# Patient Record
Sex: MALE | Race: WHITE | NOT HISPANIC OR LATINO | Employment: OTHER | URBAN - METROPOLITAN AREA
[De-identification: names, ages, dates, MRNs, and addresses within clinical notes are randomized per-mention and may not be internally consistent; named-entity substitution may affect disease eponyms.]

---

## 2023-07-05 ENCOUNTER — MEDICAL CORRESPONDENCE (OUTPATIENT)
Dept: HEALTH INFORMATION MANAGEMENT | Facility: CLINIC | Age: 68
End: 2023-07-05
Payer: COMMERCIAL

## 2023-07-06 ENCOUNTER — HOSPITAL ENCOUNTER (INPATIENT)
Facility: CLINIC | Age: 68
LOS: 6 days | Discharge: HOME | DRG: 477 | End: 2023-07-12
Attending: INTERNAL MEDICINE | Admitting: INTERNAL MEDICINE
Payer: COMMERCIAL

## 2023-07-06 ENCOUNTER — MEDICAL CORRESPONDENCE (OUTPATIENT)
Dept: HEALTH INFORMATION MANAGEMENT | Facility: CLINIC | Age: 68
End: 2023-07-06
Payer: COMMERCIAL

## 2023-07-06 DIAGNOSIS — M86.28 SUBACUTE OSTEOMYELITIS, OTHER SITE (H): Primary | ICD-10-CM

## 2023-07-06 PROBLEM — M86.9 OSTEOMYELITIS (H): Status: ACTIVE | Noted: 2023-07-06

## 2023-07-06 LAB
CREAT SERPL-MCNC: 0.59 MG/DL (ref 0.67–1.17)
GFR SERPL CREATININE-BSD FRML MDRD: >90 ML/MIN/1.73M2
HOLD SPECIMEN: NORMAL

## 2023-07-06 PROCEDURE — 36415 COLL VENOUS BLD VENIPUNCTURE: CPT | Performed by: PHYSICIAN ASSISTANT

## 2023-07-06 PROCEDURE — 99207 PR APP CREDIT; MD BILLING SHARED VISIT: CPT | Performed by: PHYSICIAN ASSISTANT

## 2023-07-06 PROCEDURE — 82565 ASSAY OF CREATININE: CPT | Performed by: PHYSICIAN ASSISTANT

## 2023-07-06 PROCEDURE — 999N000127 HC STATISTIC PERIPHERAL IV START W US GUIDANCE

## 2023-07-06 PROCEDURE — 99222 1ST HOSP IP/OBS MODERATE 55: CPT | Mod: FS | Performed by: INTERNAL MEDICINE

## 2023-07-06 PROCEDURE — 250N000011 HC RX IP 250 OP 636: Mod: JZ | Performed by: PHYSICIAN ASSISTANT

## 2023-07-06 PROCEDURE — 87040 BLOOD CULTURE FOR BACTERIA: CPT | Performed by: PHYSICIAN ASSISTANT

## 2023-07-06 PROCEDURE — 120N000002 HC R&B MED SURG/OB UMMC

## 2023-07-06 RX ORDER — AMOXICILLIN 250 MG
2 CAPSULE ORAL 2 TIMES DAILY PRN
Status: DISCONTINUED | OUTPATIENT
Start: 2023-07-06 | End: 2023-07-12 | Stop reason: HOSPADM

## 2023-07-06 RX ORDER — LIDOCAINE 40 MG/G
CREAM TOPICAL
Status: DISCONTINUED | OUTPATIENT
Start: 2023-07-06 | End: 2023-07-12 | Stop reason: HOSPADM

## 2023-07-06 RX ORDER — PIPERACILLIN SODIUM, TAZOBACTAM SODIUM 3; .375 G/15ML; G/15ML
3.38 INJECTION, POWDER, LYOPHILIZED, FOR SOLUTION INTRAVENOUS EVERY 6 HOURS
Status: DISCONTINUED | OUTPATIENT
Start: 2023-07-06 | End: 2023-07-12 | Stop reason: HOSPADM

## 2023-07-06 RX ORDER — ONDANSETRON 2 MG/ML
4 INJECTION INTRAMUSCULAR; INTRAVENOUS EVERY 6 HOURS PRN
Status: DISCONTINUED | OUTPATIENT
Start: 2023-07-06 | End: 2023-07-12 | Stop reason: HOSPADM

## 2023-07-06 RX ORDER — AMOXICILLIN 250 MG
1 CAPSULE ORAL 2 TIMES DAILY PRN
Status: DISCONTINUED | OUTPATIENT
Start: 2023-07-06 | End: 2023-07-12 | Stop reason: HOSPADM

## 2023-07-06 RX ORDER — ENOXAPARIN SODIUM 100 MG/ML
40 INJECTION SUBCUTANEOUS EVERY 24 HOURS
Status: DISCONTINUED | OUTPATIENT
Start: 2023-07-06 | End: 2023-07-10

## 2023-07-06 RX ORDER — ONDANSETRON 4 MG/1
4 TABLET, ORALLY DISINTEGRATING ORAL EVERY 6 HOURS PRN
Status: DISCONTINUED | OUTPATIENT
Start: 2023-07-06 | End: 2023-07-12 | Stop reason: HOSPADM

## 2023-07-06 RX ORDER — CALCIUM CARBONATE 500 MG/1
500 TABLET, CHEWABLE ORAL DAILY PRN
Status: DISCONTINUED | OUTPATIENT
Start: 2023-07-06 | End: 2023-07-12 | Stop reason: HOSPADM

## 2023-07-06 RX ADMIN — PIPERACILLIN AND TAZOBACTAM 3.38 G: 3; .375 INJECTION, POWDER, LYOPHILIZED, FOR SOLUTION INTRAVENOUS at 22:50

## 2023-07-06 RX ADMIN — ENOXAPARIN SODIUM 40 MG: 40 INJECTION SUBCUTANEOUS at 21:53

## 2023-07-06 ASSESSMENT — ACTIVITIES OF DAILY LIVING (ADL): ADLS_ACUITY_SCORE: 37

## 2023-07-06 NOTE — LETTER
Transition Communication Hand-off for Care Transitions to Next Level of Care Provider    Name: Steven Zavala  : 1955  MRN #: [id number]  Primary Care Provider: Merline Manley     Primary Clinic: [id number] [address]     Reason for Hospitalization:  Osteomyelitis (H) [M86.9]  Admit Date/Time: 2023  9:28 PM  Discharge Date: 2023  Payor Source: Payor: [facility] / Plan: [facility] DUAL / Product Type: HMO /     Discharge Plan:  Patient is discharging home. Team would like him to have OP follow up at a wound clinic. Sent referral/order to [facility].      Concern for non-adherence with plan of care: No   Discharge Needs Assessment:  Needs      Flowsheet Row Most Recent Value   Equipment Currently Used at Home lift device            Already enrolled in Tele-monitoring program and name of program: Unknown   Follow-up specialty is recommended: Yes    Follow-up plan:  No future appointments.    Any outstanding tests or procedures:        Referrals       Future Labs/Procedures    Wound Care Referral     Process Instructions:    Wound care services don't provide routine dressing changes. If this is for a surgical wound, a consult request should come from surgeon rather than PCP (unless surgeon is already aware).    Comments:    Please be aware that coverage of these services is subject to the terms and limitations of your health insurance plan.  Call member services at your health plan with any benefit or coverage questions.  Please call to schedule your appointment                MOR Wilson    Please see attached AVS/Discharge Summary.

## 2023-07-06 NOTE — LETTER
Recipient: [facility]           Sender: ShannanMOR           Date: July 12, 2023  Patient Name:  Steven Zavala  Patient YOB: 1955  Routing Message:    Patient needs outpatient follow up at the wound clinic. Please see attached orders. Thank you!   Shannan -[phone]      The documents accompanying this notice contain confidential information belonging to the sender.  This information is intended only for the use of the individual or entity named above.  The authorized recipient of this information is prohibited from disclosing this information to any other party and is required to destroy the information after its stated need has been fulfilled, unless otherwise required by state law.    If you are not the intended recipient, you are hereby notified that any disclosure, copy, distribution or action taken in reliance on the contents of these documents is strictly prohibited.  If you have received this document in error, please return it by fax to [phone] with a note on the cover sheet explaining why you are returning it (e.g. not your patient, not your provider, etc.).  If you need further assistance, please call .  Documents may also be returned by mail to Health Information [employer], , [address], [place], [place] [address].

## 2023-07-06 NOTE — H&P
[facility]    History and Physical - Hospitalist Service, GOLD TEAM        Date of Admission: 7/6/23    Assessment & Plan   Steven Zavala is a 67 year old male admitted on 7/6/23 to [facility], for management of sacral wound with osteomyelitis of R pubic ramus and ischial tuberosity. He has a history of quadriplegia, recent VTE on lifelong anticoagulation.     Infected full thickness pressure wound to sacrum, with tunneling  Osteomyelitis of right pubic ramus and ischial tuberosity  Wound developed approximately 6 weeks ago. Admitted to [facility] in [place] on 7/5/23 for this wound which was worsening, developed oozing. On exam at OSH tunneling was noted into deep tissue. Wound culture was obtained. Patient denies recent bleeding from site, fevers, chills. Tunneling noted. Started on vanco and Zosyn in OSH.   - W/u:    - Pelvic MRI WO/W Contrast 7/5/23 [facility]: deep medial right buttock soft tissue ulceration measuring 3.4 x 2.8 x 6.5 cm extending into the right ischial tuberosity with surrounding edema. No drainable abscess. Findings consistent with osteomyelitis involving the inferior right pubic ramus and ischial tuberosity. Reactive right inguinal lymph nodes.    - WBC not elevated. CRP 11.4, procalc 0.20.   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 Hematology at outside clinic recommended lifelong anticoagulation given his quadriplegic state + DVT hx.   - hold Eliquis for potential surgery  - subcutaneous Lovenox prophylaxis    Quadriplegia  Since 1980, paralysis from  neck down. Wife performs daily ROM and transfers at home. PT evaluated in [place], signed off. Patient declines any PT or OT here for ROM. No longer on chronic baclofen.   Neurogenic bladder chronic CIC. Insert hodges here due to likely upcoming surgery.   Recurrent UTIs Has prophylactic cipro at home. Also takes supplements to help with this.     Acute normocytic anemia, mild -trend. No evidence of bleeding from wound or other parts of body.     Chronic nausea PRN Zofran  Hx intestinal obstruction       Diet: Combination Diet Regular Diet Adult regular diet adult  DVT Prophylaxis: Enoxaparin (Lovenox) SQ  Hodges Catheter: Not present  Lines: None     Cardiac Monitoring: None  Code Status: Full Code full code    Clinically Significant Risk Factors Present on Admission                                Disposition Plan      Expected Discharge Date: 07/10/2023                The patient's care was discussed with the Attending Physician, Dr. Lindsay, Bedside Nurse and Patient.    Gabriela Muhammad PA-C  Hospitalist Service, [facility]  Securely message with [employer] info)  Text page via [facility] Paging/Directory   See signed in provider for up to date coverage information    ______________________________________________________________________    Chief Complaint   Sacral wound    History is obtained from the patient    History of Present Illness   Steven Zavala is a 67 year old male who is seen at the bedside after transfer from [facility] in [place].  He is here for management of a sacral wound with osteomyelitis.  He does not have sensation to the region so he denies any pain to the area.  He denies any recent fevers, chills, known bleeding.  He has had regular stools.  He denies any new or significant symptoms including pain to other areas of the body.  He has chronic intermittent nausea.      Past Medical History    See above.     Past Surgical History    No past surgical history on file.    Prior to Admission Medications   None        Review of Systems    The 10 point Review of Systems is negative other than noted in the HPI.     Physical Exam   Vital Signs:                    Weight: 0 lbs 0 oz    GENERAL: middle-aged male resting supine in bed, appears comfortable. NAD.   NEURO / PSYCH: Alert, converses appropriately. No focal deficits. Does not move any extremities due to quadriplegia.   HEENT: Anicteric sclera. PERRL. Mucous membranes moist.   CV: RRR. S1, S2. No murmurs appreciated.    RESPIRATORY: Effort normal. Lungs CTAB with no wheezing, rales, rhonchi.   GI: Abdomen soft and non distended with bowel sounds active. No tenderness, rebound, guarding.   MSK: no gross deformities  EXTREMITIES: nonedematous  SKIN: No jaundice. No rashes or lesions to exposed areas.       Medical Decision Making       60 MINUTES SPENT BY ME on the date of service doing chart review, history, exam, documentation & further activities per the note.      Data   No lab results found in last 7 days.

## 2023-07-06 NOTE — PROGRESS NOTES
[facility] -- Transfer Triage Note    Date of call: 23  Time of call: 1:17 PM    Reason for transfer: Procedure can be done here and not at referring hospital   Diagnosis:     R pubic ramus and ischial tuberosity with osteo.     Outside Records: Not available. Additional records requested to be faxed to [phone].    Stability of Patient: Patient is vitally stable, with no critical labs, and will likely remain stable throughout the transfer process  ICU: No    We received a phone call through our Physician Access line from Delores Cleary  PAC at [facility]. My understanding from this phone call is that Steven Zavala with  1955 is a 67 year old male with PMH of quadraplegia since , self cath, recent VTE on lifelong OAC per OSH Heme recs, and an ongoing sacral wound (appox 6 wks ago) that has now worsened, more drainage and would looking worse, on admission infectious and inflammatory markers under whelming but tunneling noted pelvic MRI showed R pubic ramus and ischial tuberosity with osteo.     Transfer Accepted? Yes  Dr Guerrier -- consult for I+D / [facility] -- primary team  Medicine team in [place] will have images of CT pelvis and MRI pelvis pushed to the cloud    Additional Comments:     Recommendations for Management and Stabilization: Given  Sending facility plans to transport patient via ambulance: Yes  Expected Time of Arrival for Transfer: 12-24 hrs  Arrival Location:  [facility]  Unit: TBD --      Kel Vinson MD

## 2023-07-07 LAB
ALBUMIN SERPL BCG-MCNC: 3.5 G/DL (ref 3.5–5.2)
ALP SERPL-CCNC: 65 U/L (ref 40–129)
ALT SERPL W P-5'-P-CCNC: 42 U/L (ref 0–70)
ANION GAP SERPL CALCULATED.3IONS-SCNC: 13 MMOL/L (ref 7–15)
AST SERPL W P-5'-P-CCNC: 22 U/L (ref 0–45)
BASOPHILS # BLD AUTO: 0.1 10E3/UL (ref 0–0.2)
BASOPHILS NFR BLD AUTO: 1 %
BILIRUB SERPL-MCNC: 0.3 MG/DL
BUN SERPL-MCNC: 18.1 MG/DL (ref 8–23)
CALCIUM SERPL-MCNC: 8.4 MG/DL (ref 8.8–10.2)
CHLORIDE SERPL-SCNC: 97 MMOL/L (ref 98–107)
CREAT SERPL-MCNC: 0.57 MG/DL (ref 0.67–1.17)
DEPRECATED HCO3 PLAS-SCNC: 21 MMOL/L (ref 22–29)
EOSINOPHIL # BLD AUTO: 0.2 10E3/UL (ref 0–0.7)
EOSINOPHIL NFR BLD AUTO: 2 %
ERYTHROCYTE [DISTWIDTH] IN BLOOD BY AUTOMATED COUNT: 12.6 % (ref 10–15)
GFR SERPL CREATININE-BSD FRML MDRD: >90 ML/MIN/1.73M2
GLUCOSE SERPL-MCNC: 96 MG/DL (ref 70–99)
HCT VFR BLD AUTO: 33.4 % (ref 40–53)
HGB BLD-MCNC: 11.2 G/DL (ref 13.3–17.7)
IMM GRANULOCYTES # BLD: 0.2 10E3/UL
IMM GRANULOCYTES NFR BLD: 2 %
LYMPHOCYTES # BLD AUTO: 1.6 10E3/UL (ref 0.8–5.3)
LYMPHOCYTES NFR BLD AUTO: 17 %
MCH RBC QN AUTO: 28.3 PG (ref 26.5–33)
MCHC RBC AUTO-ENTMCNC: 33.5 G/DL (ref 31.5–36.5)
MCV RBC AUTO: 84 FL (ref 78–100)
MONOCYTES # BLD AUTO: 0.7 10E3/UL (ref 0–1.3)
MONOCYTES NFR BLD AUTO: 7 %
NEUTROPHILS # BLD AUTO: 6.6 10E3/UL (ref 1.6–8.3)
NEUTROPHILS NFR BLD AUTO: 71 %
NRBC # BLD AUTO: 0 10E3/UL
NRBC BLD AUTO-RTO: 0 /100
PLATELET # BLD AUTO: 372 10E3/UL (ref 150–450)
POTASSIUM SERPL-SCNC: 4.9 MMOL/L (ref 3.4–5.3)
PROT SERPL-MCNC: 7 G/DL (ref 6.4–8.3)
RBC # BLD AUTO: 3.96 10E6/UL (ref 4.4–5.9)
SODIUM SERPL-SCNC: 131 MMOL/L (ref 136–145)
WBC # BLD AUTO: 9.3 10E3/UL (ref 4–11)

## 2023-07-07 PROCEDURE — 250N000013 HC RX MED GY IP 250 OP 250 PS 637: Performed by: PHYSICIAN ASSISTANT

## 2023-07-07 PROCEDURE — 99223 1ST HOSP IP/OBS HIGH 75: CPT | Performed by: INTERNAL MEDICINE

## 2023-07-07 PROCEDURE — 250N000011 HC RX IP 250 OP 636: Performed by: INTERNAL MEDICINE

## 2023-07-07 PROCEDURE — 250N000011 HC RX IP 250 OP 636: Mod: JZ | Performed by: PHYSICIAN ASSISTANT

## 2023-07-07 PROCEDURE — 99222 1ST HOSP IP/OBS MODERATE 55: CPT | Performed by: PHYSICIAN ASSISTANT

## 2023-07-07 PROCEDURE — 99232 SBSQ HOSP IP/OBS MODERATE 35: CPT | Performed by: INTERNAL MEDICINE

## 2023-07-07 PROCEDURE — 85025 COMPLETE CBC W/AUTO DIFF WBC: CPT | Performed by: PHYSICIAN ASSISTANT

## 2023-07-07 PROCEDURE — 80053 COMPREHEN METABOLIC PANEL: CPT | Performed by: PHYSICIAN ASSISTANT

## 2023-07-07 PROCEDURE — 36416 COLLJ CAPILLARY BLOOD SPEC: CPT | Performed by: PHYSICIAN ASSISTANT

## 2023-07-07 PROCEDURE — G0463 HOSPITAL OUTPT CLINIC VISIT: HCPCS | Mod: 25

## 2023-07-07 PROCEDURE — 120N000002 HC R&B MED SURG/OB UMMC

## 2023-07-07 PROCEDURE — 97602 WOUND(S) CARE NON-SELECTIVE: CPT

## 2023-07-07 PROCEDURE — 258N000003 HC RX IP 258 OP 636: Performed by: INTERNAL MEDICINE

## 2023-07-07 RX ORDER — ONDANSETRON 8 MG/1
8 TABLET, FILM COATED ORAL EVERY 8 HOURS PRN
COMMUNITY

## 2023-07-07 RX ORDER — HYDRALAZINE HYDROCHLORIDE 20 MG/ML
10 INJECTION INTRAMUSCULAR; INTRAVENOUS EVERY 6 HOURS PRN
Status: DISCONTINUED | OUTPATIENT
Start: 2023-07-07 | End: 2023-07-12 | Stop reason: HOSPADM

## 2023-07-07 RX ORDER — IBUPROFEN 200 MG
400 TABLET ORAL EVERY 6 HOURS PRN
COMMUNITY

## 2023-07-07 RX ADMIN — PIPERACILLIN AND TAZOBACTAM 3.38 G: 3; .375 INJECTION, POWDER, LYOPHILIZED, FOR SOLUTION INTRAVENOUS at 10:37

## 2023-07-07 RX ADMIN — PIPERACILLIN AND TAZOBACTAM 3.38 G: 3; .375 INJECTION, POWDER, LYOPHILIZED, FOR SOLUTION INTRAVENOUS at 04:44

## 2023-07-07 RX ADMIN — PIPERACILLIN AND TAZOBACTAM 3.38 G: 3; .375 INJECTION, POWDER, LYOPHILIZED, FOR SOLUTION INTRAVENOUS at 17:26

## 2023-07-07 RX ADMIN — VANCOMYCIN HYDROCHLORIDE 1500 MG: 10 INJECTION, POWDER, LYOPHILIZED, FOR SOLUTION INTRAVENOUS at 23:41

## 2023-07-07 RX ADMIN — VANCOMYCIN HYDROCHLORIDE 1500 MG: 10 INJECTION, POWDER, LYOPHILIZED, FOR SOLUTION INTRAVENOUS at 12:32

## 2023-07-07 RX ADMIN — VANCOMYCIN HYDROCHLORIDE 1500 MG: 10 INJECTION, POWDER, LYOPHILIZED, FOR SOLUTION INTRAVENOUS at 00:38

## 2023-07-07 RX ADMIN — PIPERACILLIN AND TAZOBACTAM 3.38 G: 3; .375 INJECTION, POWDER, LYOPHILIZED, FOR SOLUTION INTRAVENOUS at 22:06

## 2023-07-07 RX ADMIN — CALCIUM CARBONATE (ANTACID) CHEW TAB 500 MG 500 MG: 500 CHEW TAB at 10:37

## 2023-07-07 RX ADMIN — ENOXAPARIN SODIUM 40 MG: 40 INJECTION SUBCUTANEOUS at 22:06

## 2023-07-07 ASSESSMENT — ACTIVITIES OF DAILY LIVING (ADL)
ADLS_ACUITY_SCORE: 50
ADLS_ACUITY_SCORE: 60
TOILETING_ISSUES: YES
ADLS_ACUITY_SCORE: 60
BATHING: 2-->COMPLETELY DEPENDENT (NOT DEVELOPMENTALLY APPROPRIATE)
DRESSING/BATHING_DIFFICULTY: YES
TOILETING: 1-->ASSISTANCE (EQUIPMENT/PERSON) NEEDED (NOT DEVELOPMENTALLY APPROPRIATE)
ADLS_ACUITY_SCORE: 60
TRANSFERRING: 2-->COMPLETELY DEPENDENT (NOT DEVELOPMENTALLY APPROPRIATE)
ADLS_ACUITY_SCORE: 50
TRANSFERRING: 2-->COMPLETELY DEPENDENT
ADLS_ACUITY_SCORE: 50
DOING_ERRANDS_INDEPENDENTLY_DIFFICULTY: YES
CONCENTRATING,_REMEMBERING_OR_MAKING_DECISIONS_DIFFICULTY: NO
TOILETING: 1-->ASSISTANCE (EQUIPMENT/PERSON) NEEDED
EQUIPMENT_CURRENTLY_USED_AT_HOME: LIFT DEVICE
FALL_HISTORY_WITHIN_LAST_SIX_MONTHS: NO
DIFFICULTY_EATING/SWALLOWING: NO
TOILETING_ASSISTANCE: TOILETING DIFFICULTY, DEPENDENT
HEARING_DIFFICULTY_OR_DEAF: NO
ADLS_ACUITY_SCORE: 50
ADLS_ACUITY_SCORE: 50
DIFFICULTY_COMMUNICATING: NO
WEAR_GLASSES_OR_BLIND: NO
ADLS_ACUITY_SCORE: 50
ADLS_ACUITY_SCORE: 50
CHANGE_IN_FUNCTIONAL_STATUS_SINCE_ONSET_OF_CURRENT_ILLNESS/INJURY: NO
WALKING_OR_CLIMBING_STAIRS_DIFFICULTY: YES
DRESSING/BATHING: BATHING DIFFICULTY, ASSISTANCE 1 PERSON
DRESS: 2-->COMPLETELY DEPENDENT (NOT DEVELOPMENTALLY APPROPRIATE)
DRESS: 2-->COMPLETELY DEPENDENT
ADLS_ACUITY_SCORE: 50
ADLS_ACUITY_SCORE: 37

## 2023-07-07 NOTE — PHARMACY-ADMISSION MEDICATION HISTORY
Pharmacist Admission Medication History    Admission medication history is complete. The information provided in this note is only as accurate as the sources available at the time of the update.    Medication reconciliation/reorder completed by provider prior to medication history? No    Information Source(s): Hospital encounter summary from [facility] (7/5/23) and [employer] via hospital records.    Pertinent Information:   Updated PTA list according to medication history done at [facility] on 7/5/23.    Changes made to PTA medication list:    Added: All medications on PTA list.    Deleted: None    Changed: None    Medication Affordability:  Not including over the counter (OTC) medications, was there a time in the past 3 months when you did not take your medications as prescribed because of cost?: Unable to Assess    Allergies reviewed with patient and updates made in EHR: Reviewed by RN on 7/6/23.    Medication History Completed By: Turner Dunn [facility] 7/7/2023 12:31 PM    Prior to Admission medications    Medication Sig Last Dose Taking? Auth Provider Long Term End Date   apixaban ANTICOAGULANT (ELIQUIS) 5 MG tablet Take 5 mg by mouth 2 times daily  Yes Unknown, Entered By History     ibuprofen (ADVIL/MOTRIN) 200 MG tablet Take 400 mg by mouth every 6 hours as needed for pain PRN at PRN Yes Unknown, Entered By History     ondansetron (ZOFRAN) 8 MG tablet Take 8 mg by mouth every 8 hours as needed for nausea PRN at PRN Yes Unknown, Entered By History     UNABLE TO FIND Take 2 tablets by mouth daily MEDICATION NAME: Uqora (Urinary Health - Reduces UTI risk)  Yes Unknown, Entered By History     UNABLE TO FIND Take 1 packet by mouth 3 times daily MEDICATION NAME: Powder Mannose Powder  Yes Unknown, Entered By History     UNABLE TO FIND Take 1 Dose by mouth 2 times daily MEDICATION NAME: Oliver Powder (Therapeutic Nutrition Powder)  Yes Unknown, Entered By History

## 2023-07-07 NOTE — PLAN OF CARE
VS: Blood pressure (!) 141/74, pulse 80, temperature 98.7  F (37.1  C), temperature source Oral, resp. rate 16, height 1.829 m (6'), weight 108.1 kg (238 lb 5.1 oz), SpO2 100 %.   O2: 100% on RA   Output: Pt has hodges cath and is on bowl program   Last BM: 07/05/23   Activity: Liko lift. A2 with turning and repositioning.    Skin: Pt has tunneling would on left buttock. Smaller wound on R buttock.   Pain: Denies. Pt is quadriplegic.    CMS: AOX4. Pt is quadriplegic.    Dressing: WOC orders placed today. Wound care done per WOC orders.    Diet: Regular diet, thin liquids, takes pills whole with water.    LDA: L PIV for antibiotics. Hodges cath with adequate output.    Plan: Plan is for surgery in OR at [facility].    Additional Info: Wife is active in pt cares. She assists with feeding and can help with holding pt while doing wound cares.

## 2023-07-07 NOTE — CONSULTS
General surgery has been asked to assess patient's wound, a deep ulcer on the right ischial tuberosity. Has been present for a few weeks. No systemic signs of active infection. On exam, he has dark fibrinous exudate, and some fairly thick dark fluid that had pooled in the wound. I did not see any signs of necrotic tissue requiring emergent excision.  A/p - open wound on right ischial tuberosity in setting of quadriplegia since 1980. No signs of active infection. Patient was seen by orthopedic service who recommended continued wound cares and consideration of IR biopsy for osteomyelitis. I think that is a very reasonable plan. Alternatively, I also think that cleaning out the wound in the OR is an option. If primary team feels that is optimal, then he would need to be transferred over to [place] to a medicine service and reconsult general surgery. I discussed these options with patient and his wife. I emphasized to them that I think both options are reasonable. Questions answered. They are understanding of discussion and will discuss with the primary team tomorrow a.m. Please feel free to contact me with questions at pager [phone] or cell [phone].

## 2023-07-07 NOTE — PROGRESS NOTES
Received a call from patient's [facility] Care CoordinatorYanet. Provided an update.       narayan Care Yanet Keslser: [phone]    Shannan Palencia RNCC  RN Care Coordinator   Office: [phone]   Pager: [phone]

## 2023-07-07 NOTE — PHARMACY
Received a call from pharmacist at [facility] who wanted to update us that the following culture obtained at their hospital has sensitives back. Culture from right buttock on 7/5/23 grew polymicrobial organisms on gram stain (See below image copied from [employer]), currently only morganella morganii has sensitivities resulted. Of note, this morganella morganii is resistance to ampicillin and cefazolin. It is sensitive to Zosyn (among others, see below). Patient is currently receiving adequate coverage on Zosyn/vanco. Please see continue to check for updates in [employer] until culture finalized.

## 2023-07-07 NOTE — CONSULTS
Case and images reviewed with Dr. Rivas. At this time no reconstructive surgery is indicated. We would defer to general surgery if the patient requires surgical debridement of the wound. Flap reconstruction could be considered at a later time once the wound has been fully debrided, patient's medical condition and nutrition have been optimized, and appropriate social resources are in place such as low pressure bed and/or chair. Plastic surgery can be re consulted once these have been completed or follow up as outpatient at [facility] wound clinic.    Butch Berry MD  PGY-2 Plastic Surgery Resident

## 2023-07-07 NOTE — CONSULTS
GENERAL ID SERVICE CONSULTATION     Patient:  Steven Zavala   Date of birth 1955, Medical record number [id number]  Date of Visit:  07/07/2023  Date of Admission: 7/6/2023  Consult Requester:Odessa Lindsay MD            Assessment and Recommendations:   ASSESSMENT:  1. Stage 4 R IT ulceration with MRI suggesting underlying osteo. Surgical teams have evaluated and do not see an indication for surgery. The mainstay of therapy for sacral osteo in the setting of stage 4 ulcer/pressure injury is wound care +/- debridement of non-viable bone which would be a nidus for infection, with a short course of antibiotics in the near term (~2 weeks); prolonged antibiotics in the setting of an open and deep wound would be a risk for selecting resistant growth. If there is non-viable bone, his response to a 2-week course of antibiotics would be compromised. If general surgery declines debridement at this time, one strategy would be to do a diagnostic bone biopsy looking for non-viable bone that should prompt reconsideration of debridement. An alternative approach is to give a 2 week course of antibiotics and if there is no improvement or worsening proceed with debridement (or biopsy, but debridement would be preferred).  It is a bit unusual that Steven would present with ulcers now after many years without decubiti. I suspect that his ~ 3 weeks with UTI symptoms and also his DVT may have precipitated this by precluding good offloading and producing transient hypoalbuminemia, among other probable physiologic changes.  2. L IT stage 2 ulceration, improving  3. 2/2023 K oxytoca UTI, resolved  4. DVT, presumed to be provoked by 2/2023 UTI, on lifelong AC per his hematologist      RECOMMENDATION:  1. Continue pip/tazo for Morganella and vanco for GPC that appeared on gram stain from [facility] cultures that appear in our system under [facility]  2. Final identity and duration of treatment will depend on cultures. As above,  I don't anticipate a course of therapy much longer than 2 weeks   3. Await gen surg input on the role of debridement.    Thanks for this consult. ID will follow. Dr. Alexia Bentley will cover this weekend if there are questions. Dr. Yudy Patiño will assume care on 7/10. Discusse with Dr. Hernandez.    Salma Mcguire MD   of Medicine, Division of Infectious Diseases  Contact me on the [employer] rajesh or console  [facility] [phone]    ________________________________________________________________    Consult Question:.  Admission Diagnosis: Osteomyelitis (H) [M86.9]         History of Present Illness:     This is a 67-year-old man with past history of traumatic quadriplegia since 1980.  He is transferred from [facility] in [place] for management of sacral decubitus wounds.    Steven reports that he does not have a chronic history of wounds.  His recent past history was remarkable for the appearance of lower extremity spasms and malaise and February 2023.  He was ultimately diagnosed with UTI.  Urine culture done locally revealed 10-50,000 colonies Klebsiella oxytoca.  He received ciprofloxacin, and possibly a subsequent anti-infective given the duration of his symptoms which in total approximately 3 weeks; after this he did return to his prior baseline. Of note, the spasms he had with this UTI required him to spend more time in his chair than he would typically spend.    In late February 2023, he noticed leg swelling, was diagnosed with a DVT.  He saw oncology.  The conclusion was that this DVT in his lower extremity was provoked by UTI.  His oncologist reasoned that Steven's quadriplegia places him at elevated increased risk for future DVT and therefore recommended lifelong anticoagulation.  He is tolerating this well.    In the last several weeks, he has noted the appearance of bilateral wounds.  As above, he does not have a chronic history of wounds.  The left sacral wound began to show signs of  healing, but the right wound has been progressive and in the recent past has produced increasing quantities of drainage.  He presented to [facility] on 7/5/23 where a wound culture was done that shows polymorphic organisms on Gram stain and on culture preliminarily shows Morganella morganii. MRI showed: Pelvic MRI WO/W Contrast 7/5/23 [facility]: deep medial right buttock soft tissue ulceration measuring 3.4 x 2.8 x 6.5 cm extending into the right ischial tuberosity with surrounding edema. No drainable abscess. Findings consistent with osteomyelitis involving the inferior right pubic ramus and ischial tuberosity. Reactive right inguinal lymph nodes.   He was placed on empiric pip/tazo and vanco and transferred to [facility] for multidisciplinary input. Upon transfer to [facility] he was placed on empiric PIP Tazo and Vanco.   Plastic surgery has indicated there is no role for reconstruction at this time. General surgery's evaluation is pending.    In talking with Steven this afternoon, he feels relatively well.  No fever.  No malaise.  No spasm suggestive of urinary infection.    7-Day Micro Results     Procedure Component Value Units Date/Time    Blood Culture Hand, Left [[id number]]  (Normal) Collected: 07/06/23 2128    Order Status: Completed Lab Status: Preliminary result Updated: 07/07/23 1646    Specimen: Blood from Hand, Left      Culture No growth after 12 hours    Blood Culture Hand, Right [[id number]]  (Normal) Collected: 07/06/23 2128    Order Status: Completed Lab Status: Preliminary result Updated: 07/07/23 1646    Specimen: Blood from Hand, Right      Culture No growth after 12 hours           Recent culture results include:  All cultures:  No results for input(s): CULT in the last 168 hours.            Review of Systems:   CONSTITUTIONAL:  No fevers or chills  EYES: negative for icterus  ENT:  negative for hearing loss, tinnitus and sore throat  RESPIRATORY:  negative for cough with sputum and  dyspnea  CARDIOVASCULAR:  negative for chest pain, dyspnea  GASTROINTESTINAL:  negative for nausea, vomiting, diarrhea and constipation  GENITOURINARY:  negative for dysuria  HEME:  No easy bruising  INTEGUMENT:  negative for rash and pruritus  NEURO:  Negative for headache           Past Medical History:   Traumatic quadriplegia  UTI history  DVT in 2/2023, presumed to be provoked by UTI, on chronic AC in setting of his elevated risk of recurrence due to quadriplegia           Family History:   Non-contributory         Social History:     Social History     Tobacco Use     Smoking status: Not on file     Smokeless tobacco: Not on file   Substance Use Topics     Alcohol use: Not on file     History   Sexual Activity     Sexual activity: Not on file            Current Medications:       enoxaparin ANTICOAGULANT  40 mg Subcutaneous Q24H     piperacillin-tazobactam  3.375 g Intravenous Q6H     sodium chloride (PF)  3 mL Intracatheter Q8H     vancomycin  1,500 mg Intravenous Q12H            Allergies:   No Known Allergies         Physical Exam:   Vitals were reviewed  Patient Vitals for the past 24 hrs:   BP Temp Temp src Pulse Resp SpO2 Height Weight   07/07/23 1554 (!) 141/74 98.7  F (37.1  C) Oral 80 16 100 % -- --   07/07/23 0730 (!) 151/99 98.5  F (36.9  C) Oral 90 18 99 % -- --   07/06/23 2300 -- -- -- -- -- -- -- 108.1 kg (238 lb 5.1 oz)   07/06/23 2235 121/83 -- -- -- -- -- -- --   07/06/23 2231 (!) 77/63 -- -- -- -- -- -- --   07/06/23 2230 100/69 -- -- -- -- -- -- --   07/06/23 2225 (!) 88/62 -- -- -- -- -- -- --   07/06/23 2212 121/83 99.4  F (37.4  C) Oral 75 18 99 % 1.829 m (6') --     Ranges for his vital signs:  Temp:  [98.5  F (36.9  C)-99.4  F (37.4  C)] 98.7  F (37.1  C)  Pulse:  [75-90] 80  Resp:  [16-18] 16  BP: ()/(62-99) 141/74  Cuff Mean (mmHg):  [95] 95  SpO2:  [99 %-100 %] 100 %    Physical Examination:  GENERAL:  well-developed, well-nourished, in bed in no acute distress.   HEENT:  Head  is normocephalic, atraumatic   EYES:  Eyes have anicteric sclerae without conjunctival injection or stigmata of endocarditis.    ENT:  Oropharynx is moist without exudates or ulcers. Tongue is midline  NECK:  Supple. No  Cervical lymphadenopathy  LUNGS:  Clear to auscultation bilateral.   CARDIOVASCULAR:  Regular rate and rhythm with no murmurs, gallops or rubs.  ABDOMEN:  Normal bowel sounds, soft, nontender. No appreciable hepatosplenomegaly  SKIN: images reviewed from C documentation. The R IT area has a large defect, stage 4 ulcer, with surrounding erythema. L IT has a stage 2 ulcer  NEUROLOGIC:  Grossly nonfocal. Active x4 extremities         Laboratory Data:     Inflammatory Markers  No lab results found.    Hematology Studies    Recent Labs   Lab Test 07/07/23  0759   WBC 9.3   HGB 11.2*   MCV 84          Immune Globulin Studies  No lab results found.    Metabolic Studies     Recent Labs   Lab Test 07/07/23  0759 07/06/23  2250   *  --    POTASSIUM 4.9  --    CHLORIDE 97*  --    CO2 21*  --    BUN 18.1  --    CR 0.57* 0.59*   GFRESTIMATED >90 >90       Hepatic Studies    Recent Labs   Lab Test 07/07/23  0759   BILITOTAL 0.3   ALKPHOS 65   ALBUMIN 3.5   AST 22   ALT 42

## 2023-07-07 NOTE — PLAN OF CARE
Blood pressure 121/83, pulse 75, temperature 99.4  F (37.4  C), temperature source Oral, resp. rate 18, SpO2 99 %.     Shift details:  Pt arrived from [facility] at 2200. Flyer put in IV access, ABX started.     Respiratory/cardiac: Lungs clear. Denies SOB, pt wears abdominal binder to help to breath. Pt has sleep apnea, 2L NC overnight d/t SpO2 dropping. Pt said he does not wear CPAP at home.     Neuro: A/O x4. Sensation absent from neck down.    Activity: Quadriplegic, neck down. Pt moves with lift device.     GI/: LBM 7/5. Rodriguez in place, CDI.     Skin: Tunneling coccyx wound.     LDA: LPIV SL    Plan: Infectious diease consult, WOC consult.Ortho and plastics consult for evaluation for I & D. Continue POC.

## 2023-07-07 NOTE — CARE PLAN
Pt SpO2 was dropping down to low 70's, pt said he has sleep apnea. RT was paged and recommend that pt be put on 2L NC.

## 2023-07-07 NOTE — PROGRESS NOTES
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 Patient initially presented to [facility] in [facility] on 05-Jul-2023. Patient was found to have right sacral decubitus ulcer and to have MRI findings consistent with osteomyelitis involving the inferior right pubic ramus and ischial tuberosity. Patient was transferred here for higher level of care.    Cultures at OSH reportedly growing Morganella and GPC.     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   - Surgery to see.    2.) Osteomyelitis involving the inferior right pubic ramus and ischial tuberosity:  - Discussed with Infectious Disease. Patient empirically on vancomycin and zosyn at present. Awaiting further culture results.    3.) Quadriplegia:  - Monitoring for safety.    4.) Neurogenic bladder:  - Patient currently has hodges catheter in place.     5.) History of recurrent urinary tract infections:  - Patient asymptomatic for urinary tract infection at present. Monitoring for changes.    6.) Mild acute normocytic anemia:  - No indication for transfusion at present.    7.) Chronic nausea:  - Zofran as needed.    8.) Stage II left ischial tuberosity pressure injury, present on admission:  - Wound care.       Diet: Combination Diet Regular Diet Adult    Hodges Catheter: PRESENT, indication: Retention  Lines: None     Cardiac Monitoring: None  Code Status: Full Code      Clinically Significant Risk Factors Present on Admission          # Hypocalcemia: Lowest Ca = 8.4 mg/dL in last 2 days, will monitor and replace as appropriate      # Drug Induced Coagulation Defect: home medication list includes an anticoagulant medication         # Obesity:  Estimated body mass index is 32.32 kg/m  as calculated from the following:    Height as of this encounter: 1.829 m (6').    Weight as of this encounter: 108.1 kg (238 lb 5.1 oz).            Disposition Plan      Expected Discharge Date: 07/10/2023                  Harlan Hernandez MD  Hospitalist Service, GOLD TEAM 18  [facility]  Securely message with [employer] (more info)  Text page via [facility] Paging/Directory   See signed in provider for up to date coverage information  ______________________________________________________________________    Interval History     Patient noted no pain, no fever, no chills, no nausea, no vomiting and no dyspnea. Patient noted no new problems.    Physical Exam   Vital Signs: Temp: 98.5  F (36.9  C) Temp src: Oral BP: (!) 151/99 Pulse: 90   Resp: 18 SpO2: 99 % O2 Device: None (Room air)    Weight: 238 lbs 5.08 oz    General: Patient comfortable, NAD.  Heart: RRR, S1 S2 w/o murmurs.  Lungs: Breath sounds present. No crackles/wheezes heard.  Abdomen: Soft.  Skin: Wound present on right buttock with small amount of pale yellowish drainage.    Medical Decision Making

## 2023-07-07 NOTE — CARE PLAN
ADMISSION to [facility]:  Steven Zavala was admitted from [facility] for Osteomyelitis.  2 RN skin assessment: completed by writer and Rhianna MCCLELLAND  Result of skin assessment and interventions/actions: Deep wound to sacrum. Otherwise skin intact.   Height, weight: completed? Yes  Patient belongings & admission documents: see Flowsheets, completed? Yes    Pt arrived by EMS straight from [place]. Pt's underpad was changed d/t wound drainage. Provider was paged about wound orders. Was told to apply clean gauze over wound until WOC RN sees pt in AM. Pt now has IV in place with ABX started. VVS.

## 2023-07-07 NOTE — PROGRESS NOTES
"CLINICAL NUTRITION SERVICES - ASSESSMENT NOTE     Nutrition Prescription    RECOMMENDATIONS FOR MDs/PROVIDERS TO ORDER:  None today     Malnutrition Status:    Patient does not meet two of the established criteria necessary for diagnosing malnutrition    Recommendations already ordered by Registered Dietitian (RD):  Chocolate [employer] once daily at breakfast meal    1 packet of [employer] BID at lunch and dinner meals (orange at lunch, FP at dinner)    Future/Additional Recommendations:  Monitor meal/supplement intakes, wt/lab trends      REASON FOR ASSESSMENT  Steven Zavala is a/an 67 year old male assessed by the dietitian for Provider Orders - Recommendations for nutritional supplementation and c/f malnutrition, on chronic protein supplement    NUTRITION/MEDICAL HISTORY  Per chart review: Pt admits to the hospital in the setting of sacral wound with osteomyelitis of R pubic ramus and ischial tuberosity, other past medical history noted for quadriplegia, neurogenic bladder, recurrent UTI's, chronic nausea, recent VTE on lifelong anticoagulation.     Per pt visit: RD visited pt and wife at bedside, wife will assist with feeding pt and calling for meals, reviewed weight trends, pt unsure of UBW trends, note current weight is likely more accurate, prior weight noted was stated per pt/wife     CURRENT NUTRITION ORDERS  Diet: Regular  Intake/Tolerance: Pt note had held lunch meal due to possible need for procedures, otherwise reports good appetite/meal intakes prior to admission     LABS  Labs reviewed    MEDICATIONS  IV Vanco, IV Zosyn    ANTHROPOMETRICS  Height: 182.9 cm (6' 0\")  Most Recent Weight: 108.1 kg (238 lb 5.1 oz)    IBW: 80.9 kg  BMI: Obesity Grade I BMI 30-34.9  Weight History:   90.7 kg (199 lb 15.3 oz) 07/05/2023 - per CE     Weight assessment: Minimal history available in the chart and significantly varying from two weights available.     Dosing Weight: 87.7 kg - adjusted BW     ASSESSED NUTRITION " NEEDS  Estimated Energy Needs: 3643-6310 kcals/day (20 - 25 kcals/kg)  Justification: Maintenance/quad   Estimated Protein Needs:  grams protein/day (1 - 1.2 grams of pro/kg)  Justification: Maintenance vs increased needs for wound healing   Estimated Fluid Needs: 1 mL/kcal  Justification: Maintenance    MALNUTRITION  % Intake: Decreased intake does not meet criteria  % Weight Loss: None noted  Subcutaneous Fat Loss: None observed  Muscle Loss: None observed vs medical diagnosis   Fluid Accumulation/Edema: None noted  Malnutrition Diagnosis: Patient does not meet two of the established criteria necessary for diagnosing malnutrition    NUTRITION DIAGNOSIS  Predicted increased nutrient needs related to healing/wounds as evidenced by therapeutic recommendations       INTERVENTIONS  Implementation  Nutrition Education: RD reviewed the importance of adequate nutritional intakes for healing at bedside    Medical food supplement therapy - ordered as above     Goals  Patient to consume % of nutritionally adequate meal trays TID, or the equivalent with supplements/snacks.     Monitoring/Evaluation  Progress toward goals will be monitored and evaluated per protocol.    Angela Matt RD, CNSC, LD   [place] [id number] Med/Surg RD pager: [phone]

## 2023-07-07 NOTE — CONSULTS
[facility] Nurse Inpatient Assessment     Consulted for: Right IT wound    Patient History (according to provider note(s):      Per Gabriela Muhammad on 7/6/2023: Steven Zavala is a 67 year old male admitted on 7/6/23 to [facility], for management of sacral wound with osteomyelitis of R pubic ramus and ischial tuberosity. He has a history of quadriplegia, recent VTE on lifelong anticoagulation.     Assessment:      Areas visualized during today's visit: Bilateral buttocks, sacrum, coccyx    Pressure Injury Location: Right IT    7/7    Last photo: 7/7/2023  Wound type: Pressure Injury     Pressure Injury Stage: 4, present on admission   Wound history/plan of care:  Wound developed approximately 6 weeks ago. Admitted to [facility] in [place] on 7/5/23 for this wound which was worsening, developed oozing. On exam at OSH tunneling was noted into deep tissue. Wound culture was obtained. Patient denies recent bleeding from site, fevers, chills. Tunneling noted. Started on vanco and Zosyn in OSH.   - W/u:               - Pelvic MRI WO/W Contrast 7/5/23 [facility]: deep medial right buttock soft tissue ulceration measuring 3.4 x 2.8 x 6.5 cm extending into the right ischial tuberosity with surrounding edema. No drainable abscess. Findings consistent with osteomyelitis involving the inferior right pubic ramus and ischial tuberosity. Reactive right inguinal lymph nodes.               - WBC not elevated. CRP 11.4, procalc 0.20.     Wound base: 50 % necrotic tissue, 50 % non-granular tissue: all tissue with slimy clear drainage     Palpation of the wound bed: normal      Drainage: moderate     Description of drainage: clear and yellow     Measurements (length x width x depth, in cm) 3  x 4  x  7 cm      Tunneling at 10 o'clock for 7 cm, at 4 o'clock for 9 cm  Periwound skin: Intact      Color: pink      Temperature: normal   Odor: mild  Pain: absent  Pain intervention prior to  dressing change: patient tolerated well  Treatment goal: Drainage control and Infection control/prevention  STATUS: initial assessment    Pressure Injury Location: Left IT    7/7    Last photo: 7/7/2023  Wound type: Pressure Injury     Pressure Injury Stage: 2, present on admission   Wound history/plan of care:   Present on admission, per wife, wound is improving    Wound base: 50 % dermis, 50 % fibrin     Palpation of the wound bed: normal      Drainage: none     Description of drainage: none     Measurements (length x width x depth, in cm) 0.4  x 0.5  x  0.1 cm      Tunneling N/A     Undermining N/A  Periwound skin: Intact      Color: pink      Temperature: normal   Odor: none  Pain: absent  Pain intervention prior to dressing change: patient tolerated well  Treatment goal: Infection control/prevention  STATUS: initial assessment    Treatment Plan:     Right IT wound: BID and as needed if soiled with urine and/or stool: Remove packing and flush wound with 10 ml of Vashe (order #[id number]). Pack wound to full depth (7 cm) with AMD gauze (order #[id number]). Cover with ABD and secure with Primapore tape.      Left IT wound: Daily: Wash wound with Vashe (order #[id number]) and gauze. No cover dressing indicated if patient is on bedrest. If patient is up in wheelchair, please cover wound with Mepilex.     Orders: Written    RECOMMEND PRIMARY TEAM ORDER: None, at this time  Education provided: plan of care  Discussed plan of care with: Patient and Family  WOC nurse follow-up plan: weekly  Notify WOC if wound(s) deteriorate.  Nursing to notify the Provider(s) and re-consult the WOC Nurse if new skin concern.    DATA:     Current support surface: Standard  Low air loss (ROSA pump, Isolibrium, Pulsate, skin guard, etc)  Containment of urine/stool: Continent of bladder and Bowel Program  BMI: Body mass index is 32.32 kg/m .   Active diet order: Orders Placed This Encounter      Combination Diet Regular Diet Adult     Output: I/O last  3 completed shifts:  In: 8 [I.V.:8]  Out: 350 [Urine:350]     Labs: Recent Labs   Lab 07/07/23  0759   ALBUMIN 3.5   HGB 11.2*   WBC 9.3     Pressure injury risk assessment:   Sensory Perception: 1-->completely limited  Moisture: 3-->occasionally moist  Activity: 1-->bedfast  Mobility: 1-->completely immobile  Nutrition: 2-->probably inadequate  Friction and Shear: 2-->potential problem  Nicholas Score: 10    Niru Almendarez RN CWOCN  Pager no longer is use, please contact through [employer] group: [facility] Nurse   Dept. Office Number: [phone]

## 2023-07-07 NOTE — CONSULTS
Orthopaedic Surgery Consultation    DATE OF CONSULT: 7/7/2023 8:19 AM    REQUESTING PROVIDER: Oedssa Lindsay MD, MD - [facility] Medicine Staff.    CC: Right sacral decubitus ulcer with pubic ramus and ischial tuberosity osteomyelitis    DATE OF INJURY: 3-4 months, worsening last 4-6 weeks  DATE OF ADMISSION: 7/6/2023    HISTORY OF PRESENT ILLNESS:   The orthopaedic surgery service was consulted by Odessa Raya MD for evaluation and treatment recommendations of right sacral decubitus ulcer with MRI evidence of pubic ramus/ischial tuberosity osteomyelitis.    Steven Zavala is a 67 year old with PMHx of quadraplegia since 1980, recent VTE on lifelong anticoagulation, neurogenic bladder who self caths, who developed a sacral wound approximately 3-4 months ago. He has never had ulcers before, but at that time he developed a UTI and had spontaneous hip thrusting that subsided when he was in his wheelchair. He slept in his wheelchair for tow nights and wounds on buttocks and heels developed. Heel wounds healed but right sided buttock wound persists. Wife was treating with OTC feminine pads and paste/bacitracin as recommended by pharmacist. Noted to have change in wound edges over the last week.     PCP recommended he go to ED and he was admitted to OSH in [place] due to worsening drainage and opening of the wound. Pelvic MRI obtained on 7/5 and noted 3.4 x 2.8 x 6.5 cm soft tissue ulceration extending to right ischial tuberosity with no drainable abscess. Also bony findings/edema to suggest right pubic ramus and ischial tuberosity osteomyelitis with reactive right inguinal lymph nodes. He has not had leukocytosis, normal procalcitonin and lactic acid, CRP elevated 11.4. Has been vitally stable.  He has been started on Vancomycin and Zosyn. WOC, Infectious Disease, Plastics and Ortho have been consulted.     He is chronically anticoagulated on Eliquis following DVT hx in February of 2023, currently on Lovenox  while inpatient in case of need for surgery. That is his only home med aside from OTC Oliver nutritional supplements and D-Mannose as well as vitamin D supplements.     Denies fevers, chills, nausea, vomiting, diarrhea, constipation, chest pain, shortness of breath. He notes some decrease in appetite. He lives at home with wife who assists in daily cares and is able to transfer him to wheelchair.     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  [Patient denies any personal history of bleeding disorders, clotting disorders, or adverse reactions to anesthesia].    PAST SURGICAL HISTORY:    No past surgical history on file.    MEDICATIONS:   Anticoagulants: Eliquis    Prior to Admission medications    Not on File       ALLERGIES:   Patient has no known allergies.    SOCIAL HISTORY:   Social History     Socioeconomic History     Marital status: Not on file     Spouse name: Not on file     Number of children: Not on file     Years of education: Not on file     Highest education level: Not on file   Occupational History     Not on file   Tobacco Use     Smoking status: Not on file     Smokeless tobacco: Not on file   Substance and Sexual Activity     Alcohol use: Not on file     Drug use: Not on file     Sexual activity: Not on file   Other Topics Concern     Not on file   Social History Narrative     Not on file     Social Determinants of Health     Financial Resource Strain: Not on file   Food Insecurity: Not on file   Transportation Needs: Not on file   Physical Activity: Not on file   Stress: Not on file   Social Connections: Not on file   Intimate Partner Violence: Not on file   Housing Stability: Not on file     Living situation: Patient lives in a house with his wife.  Tobacco: none  Alcohol: none  Illicit Drugs: none    FAMILY HISTORY:  No family history on file.    Patient denies known family history of bleeding, clotting, or  anesthesia related complications.     REVIEW OF SYSTEMS:   Positive for decreased appetite, open wound on right buttock. Otherwise a 10-point reviews of systems was negative except as noted above in the HPI.   Patient denies any new or recent: fevers, chills, rashes, headache, sob, chest pain/pressure, abdominal pain, nausea, vomiting, leg swelling, or myalgias.     PHYSICAL EXAM:   Vitals:    07/06/23 2231 07/06/23 2235 07/06/23 2300 07/07/23 0730   BP: (!) 77/63 121/83  (!) 151/99   BP Location:    Right arm   Pulse:    90   Resp:    18   Temp:    98.5  F (36.9  C)   TempSrc:    Oral   SpO2:    99%   Weight:   108.1 kg (238 lb 5.1 oz)    Height:         General: Awake, alert, appropriate, following commands, NAD.  Neuro: EOM grossly intact  Skin: No rashes,  skin color normal.  HEENT: Normal.   Lungs: Breathing comfortably and nonlabored, no wheezes or stridor noted.  Heart/Cardiovascular: Regular pulse, no peripheral cyanosis.  Abdomen: Soft, non-tender, non-distended.       Right Upper Extremity:   - No gross deformity, skin intact without visible wounds.   - Able to passively range elbow/wrist, mild spasms with PROM.  - upper arm and forearm compartments soft, mild diffuse edema.   - Radial pulse palpable, fingers warm and well perfused.    Left Upper Extremity:   - No gross deformity, skin intact without visible wounds.   - Able to passively range elbow/wrist, mild spasms with PROM.  - upper arm and forearm compartments soft, mild diffuse edema.   - Radial pulse palpable, fingers warm and well perfused.    Left Lower Extremity:   - No gross deformity, skin intact.   - Mild diffuse edema about legs.   - Leg and thigh compartments soft and nontender.  - No significant tenderness/insensate to palpation over thigh, knee, leg, ankle, foot, or toes.  - No pain with PROM knee, ankle or toes.   - No flexion contractures appreciated at the ankle or knee  - DP/PT 2+ pulses palpable, toes warm and well perfused.    Right  Lower Extremity:   - No gross deformity, skin intact.   - Mild diffuse edema about legs.   - Leg and thigh compartments soft and nontender.  - No significant tenderness/insensate to palpation over thigh, knee, leg, ankle, foot, or toes.  - No pain with PROM knee, ankle or toes.   - No flexion contractures appreciated at the ankle or knee  - DP/PT 2+ pulses palpable, toes warm and well perfused.    Wound of the sacral region noted to have purulent drainage, malodor and some necrotic tissue at the base. Probes approximately 6cm deep. Surrounding tissue without erythema or increased warmth.           LABS:  Admission on 07/06/2023   Component Date Value Ref Range Status     Creatinine 07/06/2023 0.59 (L)  0.67 - 1.17 mg/dL Final     GFR Estimate 07/06/2023 >90  >60 mL/min/1.73m2 Final     Hold Specimen 07/06/2023 [facility]   Final     Sodium 07/07/2023 131 (L)  136 - 145 mmol/L Final     Potassium 07/07/2023 4.9  3.4 - 5.3 mmol/L Final     Chloride 07/07/2023 97 (L)  98 - 107 mmol/L Final     Carbon Dioxide (CO2) 07/07/2023 21 (L)  22 - 29 mmol/L Final     Anion Gap 07/07/2023 13  7 - 15 mmol/L Final     Urea Nitrogen 07/07/2023 18.1  8.0 - 23.0 mg/dL Final     Creatinine 07/07/2023 0.57 (L)  0.67 - 1.17 mg/dL Final     Calcium 07/07/2023 8.4 (L)  8.8 - 10.2 mg/dL Final     Glucose 07/07/2023 96  70 - 99 mg/dL Final     Alkaline Phosphatase 07/07/2023 65  40 - 129 U/L Final     AST 07/07/2023 22  0 - 45 U/L Final     ALT 07/07/2023 42  0 - 70 U/L Final     Protein Total 07/07/2023 7.0  6.4 - 8.3 g/dL Final     Albumin 07/07/2023 3.5  3.5 - 5.2 g/dL Final     Bilirubin Total 07/07/2023 0.3  <=1.2 mg/dL Final     GFR Estimate 07/07/2023 >90  >60 mL/min/1.73m2 Final     WBC Count 07/07/2023 9.3  4.0 - 11.0 10e3/uL Final     RBC Count 07/07/2023 3.96 (L)  4.40 - 5.90 10e6/uL Final     Hemoglobin 07/07/2023 11.2 (L)  13.3 - 17.7 g/dL Final     Hematocrit 07/07/2023 33.4 (L)  40.0 - 53.0 % Final     MCV 07/07/2023 84  78 - 100 fL  Final     MCH 07/07/2023 28.3  26.5 - 33.0 pg Final     MCHC 07/07/2023 33.5  31.5 - 36.5 g/dL Final     RDW 07/07/2023 12.6  10.0 - 15.0 % Final     Platelet Count 07/07/2023 372  150 - 450 10e3/uL Final     % Neutrophils 07/07/2023 71  % Final     % Lymphocytes 07/07/2023 17  % Final     % Monocytes 07/07/2023 7  % Final     % Eosinophils 07/07/2023 2  % Final     % Basophils 07/07/2023 1  % Final     % Immature Granulocytes 07/07/2023 2  % Final     NRBCs per 100 WBC 07/07/2023 0  <1 /100 Final     Absolute Neutrophils 07/07/2023 6.6  1.6 - 8.3 10e3/uL Final     Absolute Lymphocytes 07/07/2023 1.6  0.8 - 5.3 10e3/uL Final     Absolute Monocytes 07/07/2023 0.7  0.0 - 1.3 10e3/uL Final     Absolute Eosinophils 07/07/2023 0.2  0.0 - 0.7 10e3/uL Final     Absolute Basophils 07/07/2023 0.1  0.0 - 0.2 10e3/uL Final     Absolute Immature Granulocytes 07/07/2023 0.2  <=0.4 10e3/uL Final     Absolute NRBCs 07/07/2023 0.0  10e3/uL Final       BMP:  Lab Results   Component Value Date    CR 0.59 (L) 07/06/2023       Inflammatory Markers:  WBC 9.2 (7/6)  Procalcitonin 0.20 (7/5)  Lactic Acid: 1.2 (7/5)  CRP 11.4 (0.0-0.8, on 7/5)    MRSA molecular detection: not detected    Blood cultures pending    Cultures:  No results for input(s): CULT in the last 168 hours.         IMAGING:  All imaging independently reviewed.    MRI of the Pelvis W/WO contrast 7/5/23 from [facility]:  IMPRESSION:   1.   Deep medial right buttock soft tissue ulceration measuring 3.4 x 2.8 x 6.5   cm extending into the right ischial tuberosity with surrounding edema. No   drainable abscess   2.   Findings consistent with osteomyelitis involving the inferior right pubic   ramus and ischial tuberosity.   3.   Reactive right inguinal lymph nodes.       IMPRESSION:   Steven Zavala is a 67 year old male with PMH significant for quadraplegia since 1980 (farm accident), recent VTE LLE on lifelong anticoagulation (Eliquis), neurogenic bladder who self  delon, who developed a sacral wound approximately 3-4 months ago with the followin. Right sacral decubitus ulcer with MRI evidence of ischial tuberosity and right pubic ramus osteomyelitis, no drainable abscess. Currently hemodynamically stable, no fevers or leukocytosis.     RECOMMENDATIONS:   -Operative Plan: No plan for imminent operative procedure as patient is HDS and favor trial of wound care and antibiotic course for initial treatment     - Okay for diet from Orthopedic standpoint.      - Agree with Plastics consult for recommendations     - WOC for wound care recommendations/managment     - Consider consulting IR if biopsy is recommended by ID  - Antibiotics/Tetanus: continue broad spectrum antibiotics and await ID antibiotic recommendations.   - X-rays/Imaging: Awaiting MRI imaging of the pelvis to be pushed to PACs, radiology calling  - Optimizing nutrition for wound healing-patient uses Oliver as outpatient. Consider nutrition consult.     Orthopaedics will otherwise plan to sign off at this time but is available should there be any acute changes in patient status or need for urgent surgical intervention. Please call or page me or the on-call resident with any concerns or questions.    Assessment and Plan discussed with Dr. Radha Solis PGY 4 and Dr. Artur Castro, Orthopedic Surgery attending.     Shanna Ernst PA-C  2023 8:19 AM  Orthopaedic Surgery     Thank you for allowing me to participate in this patient's care. Please page me directly any questions/concerns.   Securely message with the Vocera Web Console (learn more here)  Text page via [employer] Paging/Directory    If there is no response, if it is a weekend, or if it is during evening hours, please page the orthopaedic surgery resident on call via [facility] Paging/Directory

## 2023-07-07 NOTE — PHARMACY-VANCOMYCIN DOSING SERVICE
"Pharmacy Vancomycin Initial Note  Date of Service 2023  Patient's  1955  67 year old, male    Indication: Osteomyelitis    Current estimated CrCl = 96.6    Creatinine for last 3 days  SCr = 0.72  On  @ 07:59 (OSH)    Recent Vancomycin Level(s) for last 3 days  No results found for requested labs within last 3 days.      Vancomycin IV Administrations (past 72 hours)      No vancomycin orders with administrations in past 72 hours.                Nephrotoxins and other renal medications (From now, onward)    Start     Dose/Rate Route Frequency Ordered Stop    23 2330  vancomycin (VANCOCIN) 1,500 mg in 0.9% NaCl 250 mL intermittent infusion         1,500 mg  over 90 Minutes Intravenous EVERY 12 HOURS 23 2320      23 2230  piperacillin-tazobactam (ZOSYN) 3.375 g vial to attach to  mL bag        Note to Pharmacy: For [facility], [facility] and [facility]: For Zosyn-naive patients, use the \"Zosyn initial dose + extended infusion\" order panel.    3.375 g  over 30 Minutes Intravenous EVERY 6 HOURS 23 2132            Contrast Orders - past 72 hours (72h ago, onward)    None          InsightRX Prediction of Planned Initial Vancomycin Regimen  Loading dose: 1750 mg at 11:03 2023.(at OSH)  Regimen: 1500 mg IV every 12 hours.  Start time: 23:21 on 2023  Exposure target: AUC24 (range)400-600 mg/L.hr   AUC24,ss: 550 mg/L.hr  Probability of AUC24 > 400: 81 %  Ctrough,ss: 17.2 mg/L  Probability of Ctrough,ss > 20: 38 %  Probability of nephrotoxicity (Lodise LALIT ): 13 %          Plan:  1. Start vancomycin  1500 mg IV q12h.   2. Vancomycin monitoring method: AUC  3. Vancomycin therapeutic monitoring goal: 400-600 mg*h/L  4. Pharmacy will check vancomycin levels as appropriate in 1-3 Days.    5. Serum creatinine levels will be ordered daily for the first week of therapy and at least twice weekly for subsequent weeks.      Thierry Freed, [facility]  "

## 2023-07-08 LAB
ANION GAP SERPL CALCULATED.3IONS-SCNC: 9 MMOL/L (ref 7–15)
BUN SERPL-MCNC: 18.6 MG/DL (ref 8–23)
CALCIUM SERPL-MCNC: 8.6 MG/DL (ref 8.8–10.2)
CHLORIDE SERPL-SCNC: 101 MMOL/L (ref 98–107)
CREAT SERPL-MCNC: 0.61 MG/DL (ref 0.67–1.17)
CREAT SERPL-MCNC: 0.65 MG/DL (ref 0.67–1.17)
DEPRECATED HCO3 PLAS-SCNC: 22 MMOL/L (ref 22–29)
GFR SERPL CREATININE-BSD FRML MDRD: >90 ML/MIN/1.73M2
GFR SERPL CREATININE-BSD FRML MDRD: >90 ML/MIN/1.73M2
GLUCOSE SERPL-MCNC: 88 MG/DL (ref 70–99)
MRSA DNA SPEC QL NAA+PROBE: NEGATIVE
POTASSIUM SERPL-SCNC: 4 MMOL/L (ref 3.4–5.3)
SA TARGET DNA: POSITIVE
SODIUM SERPL-SCNC: 132 MMOL/L (ref 136–145)
VANCOMYCIN SERPL-MCNC: 24.1 UG/ML

## 2023-07-08 PROCEDURE — 258N000003 HC RX IP 258 OP 636: Performed by: INTERNAL MEDICINE

## 2023-07-08 PROCEDURE — 99207 PR NO CHARGE LOS: CPT | Performed by: PEDIATRICS

## 2023-07-08 PROCEDURE — 80202 ASSAY OF VANCOMYCIN: CPT

## 2023-07-08 PROCEDURE — 36415 COLL VENOUS BLD VENIPUNCTURE: CPT

## 2023-07-08 PROCEDURE — 99232 SBSQ HOSP IP/OBS MODERATE 35: CPT | Performed by: INTERNAL MEDICINE

## 2023-07-08 PROCEDURE — 250N000011 HC RX IP 250 OP 636: Mod: JZ | Performed by: PHYSICIAN ASSISTANT

## 2023-07-08 PROCEDURE — 120N000002 HC R&B MED SURG/OB UMMC

## 2023-07-08 PROCEDURE — 82565 ASSAY OF CREATININE: CPT

## 2023-07-08 PROCEDURE — 80048 BASIC METABOLIC PNL TOTAL CA: CPT | Performed by: INTERNAL MEDICINE

## 2023-07-08 PROCEDURE — 87641 MR-STAPH DNA AMP PROBE: CPT | Performed by: INTERNAL MEDICINE

## 2023-07-08 PROCEDURE — 250N000011 HC RX IP 250 OP 636: Performed by: INTERNAL MEDICINE

## 2023-07-08 RX ORDER — SODIUM CHLORIDE 9 MG/ML
INJECTION, SOLUTION INTRAVENOUS CONTINUOUS
Status: ACTIVE | OUTPATIENT
Start: 2023-07-08 | End: 2023-07-08

## 2023-07-08 RX ORDER — BISACODYL 10 MG
10 SUPPOSITORY, RECTAL RECTAL DAILY PRN
Status: DISCONTINUED | OUTPATIENT
Start: 2023-07-08 | End: 2023-07-12 | Stop reason: HOSPADM

## 2023-07-08 RX ADMIN — PIPERACILLIN AND TAZOBACTAM 3.38 G: 3; .375 INJECTION, POWDER, LYOPHILIZED, FOR SOLUTION INTRAVENOUS at 21:48

## 2023-07-08 RX ADMIN — ENOXAPARIN SODIUM 40 MG: 40 INJECTION SUBCUTANEOUS at 21:54

## 2023-07-08 RX ADMIN — Medication 1250 MG: at 23:18

## 2023-07-08 RX ADMIN — SODIUM CHLORIDE: 9 INJECTION, SOLUTION INTRAVENOUS at 16:48

## 2023-07-08 RX ADMIN — PIPERACILLIN AND TAZOBACTAM 3.38 G: 3; .375 INJECTION, POWDER, LYOPHILIZED, FOR SOLUTION INTRAVENOUS at 03:19

## 2023-07-08 RX ADMIN — PIPERACILLIN AND TAZOBACTAM 3.38 G: 3; .375 INJECTION, POWDER, LYOPHILIZED, FOR SOLUTION INTRAVENOUS at 11:07

## 2023-07-08 RX ADMIN — Medication 1250 MG: at 13:15

## 2023-07-08 RX ADMIN — PIPERACILLIN AND TAZOBACTAM 3.38 G: 3; .375 INJECTION, POWDER, LYOPHILIZED, FOR SOLUTION INTRAVENOUS at 16:47

## 2023-07-08 ASSESSMENT — ACTIVITIES OF DAILY LIVING (ADL)
ADLS_ACUITY_SCORE: 60

## 2023-07-08 NOTE — PLAN OF CARE
VS: Vital signs:  Temp: 97.8  F (36.6  C) Temp src: Oral BP: (!) 159/95 Pulse: 82   Resp: 18 SpO2: 98 % O2 Device: None (Room air)   Height: 182.9 cm (6') Weight: 108.1 kg (238 lb 5.1 oz)     O2: >95% on RA, No chest pain or chest pain   Output: Voiding spontaneously without difficulty with urethral cath,     Last BM: 7/6/23   Activity: Paraplegia    Skin: Visible skin intact, Left Ischial tuberosity pressure injury stage 4 open to air, Right Ischial tuberosity pressure injury Stage 2.    Pain: No pain rated this shift   CMS: A/O x 4, No numbness or tingling   Dressing: Dressing on Right and Left Ischial tuberosity,    Diet: Regular diet   LDA: Left PIV runnign   Equipment: IV pole, Personal and call light is within reach   Plan: Cont with POC   Additional Info:  Strict I/Os

## 2023-07-08 NOTE — PROGRESS NOTES
Transfer Type: [facility]  Transfer Triage Note    Originating unit: [facility] [place] [facility], not [facility] ED    Final intended location for transfer: [facility] - med surg or IMC     What services or anticipated services require transfer to the [place]? (please refer to triage job guide or discuss with PP RN if [facility] capabilities would be appropriate)   -- Gen Surg (debridement cannot be done on [facility])  -- ID    Tele required:  No  Time of admission request: 1041h sat 7/8   Anticipated to be boarding in ED for >4 hours? No -- N/A  Patient added to Interhospital transfer list?  Yes     Brief case description:     67 year old male with PMH of quadraplegia since 1980, self cath, recent VTE on lifelong OAC per OSH Heme recs, and an ongoing sacral wound (appox 6 wks ago) that has now worsened, more drainage and would looking worse, on admission infectious and inflammatory markers under whelming but tunneling noted pelvic MRI showed R pubic ramus and ischial tuberosity with osteo.   -- See Ortho notes, plan is for [facility] transfer and Gen Surg consult for debridement  -- current capacity is very limited, unlikely weekend transfer, patient is stable and can remain at  until bed open, not yet had OR time booked on [facility]     Kel Vinson MD

## 2023-07-08 NOTE — PLAN OF CARE
Pt is A&Ox4. Pt is quad, pt request when he will like to be reposition. VSS. Pt denies pain. R and L IT W]wound dressing is changed this AM. L PIV is patent and infusing TKO. Pt has not been oob. Continue to monitor.

## 2023-07-08 NOTE — PROGRESS NOTES
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 Patient initially presented to [facility] in [facility] on 05-Jul-2023. Patient was found to have right sacral decubitus ulcer and to have MRI findings consistent with osteomyelitis involving the inferior right pubic ramus and ischial tuberosity. Patient was transferred here for higher level of care.     Cultures at OSH reportedly growing Morganella and [facility].      P:  1.) Stage IV right ischial tuberosity pressure injury, present on admission:  - Surgery input appreciated. Discussed with patient. Plan is to transfer patient to [place] for possible surgical debridement.      2.) Osteomyelitis involving the inferior right pubic ramus and ischial tuberosity:  - Discussed with Infectious Disease. Patient empirically on vancomycin and zosyn at present. Awaiting further culture results.     3.) Quadriplegia:  - Monitoring for safety.     4.) Neurogenic bladder:  - Patient currently has hodges catheter in place.      5.) History of recurrent urinary tract infections:  - Patient asymptomatic for urinary tract infection at present. Monitoring for changes.     6.) Mild acute normocytic anemia:  - No indication for transfusion at present.     7.) Chronic nausea:  - Zofran as needed.     8.) Stage II left ischial tuberosity pressure injury, present on admission:  - Wound care.    9.) Hyponatremia:  - Awaiting f/u labs.    10.) Elevated blood pressure without diagnosis of hypertension:  - IV hydralazine as needed.    11.) Personal history of DVT; patient on eliquis for anticoagulation as outpatient:  - Eliquis on hold in anticipation of possible debridement.  - Patient on lovenox 40 mg subcutaneous daily for DVT  prophylaxis for now.       Diet: Combination Diet Regular Diet Adult  Snacks/Supplements Adult: Mary Grace Fernandez Standard Oral Supplement; With Meals  Snacks/Supplements Adult: Oliver; With Meals    Rodriguez Catheter: PRESENT, indication: Retention  Lines: None     Cardiac Monitoring: None  Code Status: Full Code      Clinically Significant Risk Factors          # Hypocalcemia: Lowest Ca = 8.4 mg/dL in last 2 days, will monitor and replace as appropriate                # Obesity: Estimated body mass index is 32.32 kg/m  as calculated from the following:    Height as of this encounter: 1.829 m (6').    Weight as of this encounter: 108.1 kg (238 lb 5.1 oz)., PRESENT ON ADMISSION          Disposition Plan     Expected Discharge Date: 07/10/2023                  Harlan Hernandez MD  Hospitalist Service, [facility] 18  [facility]  Securely message with [email or web address] (more info)  Text page via [employer] Paging/Directory   See signed in provider for up to date coverage information  ______________________________________________________________________    Interval History     Patient noted feeling well and noted no new problems. Patient noted no fever and no chills.    Physical Exam   Vital Signs: Temp: 99.4  F (37.4  C) Temp src: Oral BP: (!) 141/83 Pulse: 79   Resp: 16 SpO2: 99 % O2 Device: None (Room air)    Weight: 238 lbs 5.08 oz    General: Patient comfortable, NAD.  Heart: RRR, S1 S2 w/o murmurs.  Lungs: Breath sounds present. No crackles/wheezes heard.  Abdomen: Soft, nontender.    Medical Decision Making

## 2023-07-08 NOTE — PHARMACY-VANCOMYCIN DOSING SERVICE
"Pharmacy Vancomycin Note  Date of Service 2023  Patient's  1955   67 year old, male    Indication: Osteomyelitis  Day of Therapy: Since 23  Current vancomycin regimen:  1500 mg IV q12h  Current vancomycin monitoring method: AUC  Current vancomycin therapeutic monitoring goal: 400-600 mg*h/L    InsightRX Prediction of Current Vancomycin Regimen  Loading dose: 1750 mg 23 @1103   Regimen: 1500 mg IV every 12 hours.  Exposure target: AUC24 (range)400-600 mg/L.hr   AUC24,ss: 600 mg/L.hr  Probability of AUC24 > 400: 98 %  Ctrough,ss: 18.8 mg/L  Probability of Ctrough,ss > 20: 42 %  Probability of nephrotoxicity (Lodise LALIT ): 15 %    Current estimated CrCl = Estimated Creatinine Clearance: 153.3 mL/min (A) (based on SCr of 0.61 mg/dL (L)).    Creatinine for last 3 days  2023: 10:50 PM Creatinine 0.59 mg/dL  2023:  7:59 AM Creatinine 0.57 mg/dL  2023:  5:40 AM Creatinine 0.61 mg/dL    Recent Vancomycin Levels (past 3 days)  2023:  5:40 AM Vancomycin 24.1 ug/mL    Vancomycin IV Administrations (past 72 hours)                   vancomycin (VANCOCIN) 1,500 mg in 0.9% NaCl 250 mL intermittent infusion (mg) 1,500 mg New Bag 23 2341     1,500 mg New Bag  1232     1,500 mg New Bag  0038                Nephrotoxins and other renal medications (From now, onward)    Start     Dose/Rate Route Frequency Ordered Stop    23 2330  vancomycin (VANCOCIN) 1,500 mg in 0.9% NaCl 250 mL intermittent infusion         1,500 mg  over 90 Minutes Intravenous EVERY 12 HOURS 23 2320      23 2230  piperacillin-tazobactam (ZOSYN) 3.375 g vial to attach to  mL bag        Note to Pharmacy: For [facility], [facility] and [facility]: For Zosyn-naive patients, use the \"Zosyn initial dose + extended infusion\" order panel.    3.375 g  over 30 Minutes Intravenous EVERY 6 HOURS 23 2132               Contrast Orders - past 72 hours (72h ago, onward)    None          Interpretation of levels and current " regimen:  Vancomycin level is reflective of -600, however patient is expected to accumulate.     Has serum creatinine changed greater than 50% in last 72 hours: No    Urine output:  good urine output    Renal Function: Stable    InsightRX Prediction of Planned New Vancomycin Regimen  Loading dose: N/A  Regimen: 1250 mg IV every 12 hours.  Start time: 11:41 on 07/08/2023  Exposure target: AUC24 (range)400-600 mg/L.hr   AUC24,ss: 502 mg/L.hr  Probability of AUC24 > 400: 88 %  Ctrough,ss: 15.7 mg/L  Probability of Ctrough,ss > 20: 20 %  Probability of nephrotoxicity (Lodise LALIT 2009): 11 %    Plan:  1. Decrease Dose to 1250 mg IV q12h  2. Vancomycin monitoring method: AUC  3. Vancomycin therapeutic monitoring goal: 400-600 mg*h/L  4. Pharmacy will check vancomycin levels as appropriate in 1-3 Days.  5. Serum creatinine levels will be ordered daily for the first week of therapy and at least twice weekly for subsequent weeks.    Brianna Henry, PharmD, BCPS

## 2023-07-08 NOTE — PROGRESS NOTES
Pt is alert&Ox4, call light within reach, able to make needs known. Pt on RA.     On liko lift with assistx2     L&R IT wound. Wound care was done this shift.     Reposition Q2. Wife at beside, helps with care.     Rodriguez draining adequately.     Skin: red moles scattered on back and some on legs, and small abrasion on R hip.    P:continue with plan of care

## 2023-07-08 NOTE — PLAN OF CARE
A&Ox4,L PIV infusing- TKO w/ intermittent antibiotic medication infusing. Rodriguez in place, provided catheter care. Wife at bedside helps w/ cares. Reposition ever 2 hours. Continue POC.

## 2023-07-09 LAB
ANION GAP SERPL CALCULATED.3IONS-SCNC: 11 MMOL/L (ref 7–15)
BUN SERPL-MCNC: 18.4 MG/DL (ref 8–23)
CALCIUM SERPL-MCNC: 8.8 MG/DL (ref 8.8–10.2)
CHLORIDE SERPL-SCNC: 100 MMOL/L (ref 98–107)
CREAT SERPL-MCNC: 0.58 MG/DL (ref 0.67–1.17)
DEPRECATED HCO3 PLAS-SCNC: 22 MMOL/L (ref 22–29)
ERYTHROCYTE [DISTWIDTH] IN BLOOD BY AUTOMATED COUNT: 12.6 % (ref 10–15)
GFR SERPL CREATININE-BSD FRML MDRD: >90 ML/MIN/1.73M2
GLUCOSE SERPL-MCNC: 121 MG/DL (ref 70–99)
HCT VFR BLD AUTO: 35 % (ref 40–53)
HGB BLD-MCNC: 11.6 G/DL (ref 13.3–17.7)
MCH RBC QN AUTO: 28.6 PG (ref 26.5–33)
MCHC RBC AUTO-ENTMCNC: 33.1 G/DL (ref 31.5–36.5)
MCV RBC AUTO: 86 FL (ref 78–100)
PLATELET # BLD AUTO: 322 10E3/UL (ref 150–450)
POTASSIUM SERPL-SCNC: 3.6 MMOL/L (ref 3.4–5.3)
RBC # BLD AUTO: 4.06 10E6/UL (ref 4.4–5.9)
SODIUM SERPL-SCNC: 133 MMOL/L (ref 136–145)
WBC # BLD AUTO: 7.8 10E3/UL (ref 4–11)

## 2023-07-09 PROCEDURE — 80048 BASIC METABOLIC PNL TOTAL CA: CPT | Performed by: INTERNAL MEDICINE

## 2023-07-09 PROCEDURE — 99232 SBSQ HOSP IP/OBS MODERATE 35: CPT | Performed by: INTERNAL MEDICINE

## 2023-07-09 PROCEDURE — 250N000011 HC RX IP 250 OP 636: Mod: JZ | Performed by: PHYSICIAN ASSISTANT

## 2023-07-09 PROCEDURE — 258N000003 HC RX IP 258 OP 636: Performed by: INTERNAL MEDICINE

## 2023-07-09 PROCEDURE — 36415 COLL VENOUS BLD VENIPUNCTURE: CPT | Performed by: INTERNAL MEDICINE

## 2023-07-09 PROCEDURE — 85027 COMPLETE CBC AUTOMATED: CPT | Performed by: INTERNAL MEDICINE

## 2023-07-09 PROCEDURE — 250N000013 HC RX MED GY IP 250 OP 250 PS 637: Performed by: PHYSICIAN ASSISTANT

## 2023-07-09 PROCEDURE — 120N000002 HC R&B MED SURG/OB UMMC

## 2023-07-09 PROCEDURE — 250N000011 HC RX IP 250 OP 636: Performed by: INTERNAL MEDICINE

## 2023-07-09 RX ORDER — SODIUM CHLORIDE 450 MG/100ML
INJECTION, SOLUTION INTRAVENOUS
Status: COMPLETED
Start: 2023-07-09 | End: 2023-07-09

## 2023-07-09 RX ADMIN — BISACODYL 10 MG: 10 SUPPOSITORY RECTAL at 21:05

## 2023-07-09 RX ADMIN — PIPERACILLIN AND TAZOBACTAM 3.38 G: 3; .375 INJECTION, POWDER, LYOPHILIZED, FOR SOLUTION INTRAVENOUS at 11:30

## 2023-07-09 RX ADMIN — PIPERACILLIN AND TAZOBACTAM 3.38 G: 3; .375 INJECTION, POWDER, LYOPHILIZED, FOR SOLUTION INTRAVENOUS at 23:59

## 2023-07-09 RX ADMIN — Medication 1250 MG: at 13:01

## 2023-07-09 RX ADMIN — PIPERACILLIN AND TAZOBACTAM 3.38 G: 3; .375 INJECTION, POWDER, LYOPHILIZED, FOR SOLUTION INTRAVENOUS at 04:37

## 2023-07-09 RX ADMIN — PIPERACILLIN AND TAZOBACTAM 3.38 G: 3; .375 INJECTION, POWDER, LYOPHILIZED, FOR SOLUTION INTRAVENOUS at 17:22

## 2023-07-09 ASSESSMENT — ACTIVITIES OF DAILY LIVING (ADL)
ADLS_ACUITY_SCORE: 56
ADLS_ACUITY_SCORE: 54
ADLS_ACUITY_SCORE: 54
ADLS_ACUITY_SCORE: 56
ADLS_ACUITY_SCORE: 54
ADLS_ACUITY_SCORE: 56

## 2023-07-09 NOTE — PLAN OF CARE
Goal Outcome Evaluation:    AOX4,pleasant,cooperative.Low grade temp of 99.4,pt was fully covered at that time,removed pile of blankets and temp reduced to 99.2.Will continue to monitor temp.  Buttock drsg,CDI.  Rodriguez outputting good amts,clear yellow urine.  Last BM 7/5. Has suppository whenever pt is awake for it.Declined overnight,wanting to sleep.  PIV line L forearm for IV vanco and zosyn.  LS clear,though desatts to 70's occasionally when asleep but backs up quickly to upper 90's.  Placed pt on 2l NC.Pt reports he does have sleep apnea but doesn't wear CPAP at home.  Wife in room,involved with cares.

## 2023-07-09 NOTE — PROGRESS NOTES
Pt is alert&Ox4, call light within reach, able to make needs known. Pt on RA.      On liko lift with assistx2      L&R IT wound. Wound care was done this shift.      Reposition Q2. Wife at beside, helps with care.      Rodriguez draining adequately.     Catheter care done      Skin: red moles scattered on back and some on legs, and small abrasion on R hip.     P:continue with plan of care

## 2023-07-09 NOTE — PROGRESS NOTES
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 Patient initially presented to [facility] in [facility] [place] on 05-Jul-2023. Patient was found to have right sacral decubitus ulcer and to have MRI findings consistent with osteomyelitis involving the inferior right pubic ramus and ischial tuberosity. Patient was transferred here for higher level of care.     Cultures at OSH reportedly growing Morganella and GPC.     Patient has hyponatremia. From review of labs in Care Everywhere, patient appears to have chronic hyponatremia.     P:  1.) Stage IV right ischial tuberosity pressure injury, present on admission:  - Surgery input appreciated. Discussed with patient.   - Patient now wishes to have bone biopsy before decision on debridement. Will consult Interventional Radiology for bone biopsy.      2.) Osteomyelitis involving the inferior right pubic ramus and ischial tuberosity:  - Discussed with Infectious Disease. Patient empirically on vancomycin and zosyn at present. Awaiting further culture results.     3.) Quadriplegia:  - Monitoring for safety.     4.) Neurogenic bladder:  - Patient currently has hodges catheter in place.      5.) History of recurrent urinary tract infections:  - Patient asymptomatic for urinary tract infection at present. Monitoring for changes.     6.) Mild acute normocytic anemia:  - No indication for transfusion at present.     7.) Chronic nausea:  - Zofran as needed.     8.) Stage II left ischial tuberosity pressure injury, present on admission:  - Wound care.     9.) Hyponatremia, chronic:  - Sodium around baseline. No intervention indicated.     10.) Elevated blood pressure without diagnosis of hypertension:  - IV hydralazine as  needed.     11.) Personal history of DVT; patient on eliquis for anticoagulation as outpatient:  - Eliquis on hold in anticipation of procedures.  - Patient had been on lovenox 40 mg subcutaneous daily but this will be held for possible bone biopsy.          Diet: Combination Diet Regular Diet Adult  Snacks/Supplements Adult: [employer] Standard Oral Supplement; With Meals  Snacks/Supplements Adult: Oliver; With Meals  NPO per Anesthesia Guidelines for Procedure/Surgery Except for: Meds    Rodriguez Catheter: PRESENT, indication: Retention  Lines: None     Cardiac Monitoring: None  Code Status: Full Code      Clinically Significant Risk Factors                         # Obesity: Estimated body mass index is 32.32 kg/m  as calculated from the following:    Height as of this encounter: 1.829 m (6').    Weight as of this encounter: 108.1 kg (238 lb 5.1 oz)., PRESENT ON ADMISSION          Disposition Plan     Expected Discharge Date: 07/10/2023                  Harlan Hernandez MD  Hospitalist Service, [facility] 18  [facility]  Securely message with [employer] (more info)  Text page via [facility] Paging/Directory   See signed in provider for up to date coverage information  ______________________________________________________________________    Interval History     Patient noted no pain, no fever, no chills, no dyspnea and no new problems.    Patient expressed preference for having bone biopsy before deciding on surgical debridement.    Physical Exam   Vital Signs: Temp: 98.2  F (36.8  C) Temp src: Oral BP: 134/81 Pulse: 77   Resp: 18 SpO2: 98 % O2 Device: None (Room air) Oxygen Delivery: 2 LPM  Weight: 238 lbs 5.08 oz    General: Patient comfortable, NAD.    Labs noted:  Sodium 133; Potassium 3.36; Creatinine 0.58;  WBC 7.8; Hb 11.6; Platelets 322    Medical Decision Making

## 2023-07-10 ENCOUNTER — APPOINTMENT (OUTPATIENT)
Dept: CT IMAGING | Facility: CLINIC | Age: 68
DRG: 477 | End: 2023-07-10
Attending: PHYSICIAN ASSISTANT
Payer: COMMERCIAL

## 2023-07-10 LAB
CREAT SERPL-MCNC: 0.58 MG/DL (ref 0.67–1.17)
GFR SERPL CREATININE-BSD FRML MDRD: >90 ML/MIN/1.73M2
INR PPP: 1.26 (ref 0.85–1.15)
POTASSIUM SERPL-SCNC: 4 MMOL/L (ref 3.4–5.3)
VANCOMYCIN SERPL-MCNC: 21.4 UG/ML

## 2023-07-10 PROCEDURE — 272N000431 CT BONE BIOPSY DEEP

## 2023-07-10 PROCEDURE — 250N000011 HC RX IP 250 OP 636: Mod: JZ | Performed by: RADIOLOGY

## 2023-07-10 PROCEDURE — 250N000011 HC RX IP 250 OP 636: Mod: JZ | Performed by: PHYSICIAN ASSISTANT

## 2023-07-10 PROCEDURE — 80202 ASSAY OF VANCOMYCIN: CPT | Performed by: INTERNAL MEDICINE

## 2023-07-10 PROCEDURE — 87205 SMEAR GRAM STAIN: CPT | Performed by: INTERNAL MEDICINE

## 2023-07-10 PROCEDURE — 82565 ASSAY OF CREATININE: CPT

## 2023-07-10 PROCEDURE — 99232 SBSQ HOSP IP/OBS MODERATE 35: CPT | Performed by: INTERNAL MEDICINE

## 2023-07-10 PROCEDURE — 258N000003 HC RX IP 258 OP 636: Performed by: INTERNAL MEDICINE

## 2023-07-10 PROCEDURE — 99233 SBSQ HOSP IP/OBS HIGH 50: CPT | Performed by: INTERNAL MEDICINE

## 2023-07-10 PROCEDURE — 36415 COLL VENOUS BLD VENIPUNCTURE: CPT | Performed by: INTERNAL MEDICINE

## 2023-07-10 PROCEDURE — 120N000002 HC R&B MED SURG/OB UMMC

## 2023-07-10 PROCEDURE — 84132 ASSAY OF SERUM POTASSIUM: CPT | Performed by: INTERNAL MEDICINE

## 2023-07-10 PROCEDURE — 36415 COLL VENOUS BLD VENIPUNCTURE: CPT | Performed by: PHYSICIAN ASSISTANT

## 2023-07-10 PROCEDURE — 77012 CT SCAN FOR NEEDLE BIOPSY: CPT | Mod: 26 | Performed by: RADIOLOGY

## 2023-07-10 PROCEDURE — 87075 CULTR BACTERIA EXCEPT BLOOD: CPT | Performed by: INTERNAL MEDICINE

## 2023-07-10 PROCEDURE — 0QB23ZX EXCISION OF RIGHT PELVIC BONE, PERCUTANEOUS APPROACH, DIAGNOSTIC: ICD-10-PCS | Performed by: RADIOLOGY

## 2023-07-10 PROCEDURE — 87176 TISSUE HOMOGENIZATION CULTR: CPT | Performed by: INTERNAL MEDICINE

## 2023-07-10 PROCEDURE — 88307 TISSUE EXAM BY PATHOLOGIST: CPT | Mod: 26 | Performed by: PATHOLOGY

## 2023-07-10 PROCEDURE — 250N000011 HC RX IP 250 OP 636: Performed by: INTERNAL MEDICINE

## 2023-07-10 PROCEDURE — 20225 BONE BIOPSY TROCAR/NDL DEEP: CPT | Mod: RT | Performed by: RADIOLOGY

## 2023-07-10 PROCEDURE — 85610 PROTHROMBIN TIME: CPT | Performed by: PHYSICIAN ASSISTANT

## 2023-07-10 PROCEDURE — 88307 TISSUE EXAM BY PATHOLOGIST: CPT | Mod: TC | Performed by: INTERNAL MEDICINE

## 2023-07-10 RX ORDER — ENOXAPARIN SODIUM 100 MG/ML
40 INJECTION SUBCUTANEOUS EVERY 24 HOURS
Status: DISCONTINUED | OUTPATIENT
Start: 2023-07-11 | End: 2023-07-12 | Stop reason: HOSPADM

## 2023-07-10 RX ORDER — BUPIVACAINE HYDROCHLORIDE 2.5 MG/ML
2 INJECTION, SOLUTION EPIDURAL; INFILTRATION; INTRACAUDAL ONCE
Status: COMPLETED | OUTPATIENT
Start: 2023-07-10 | End: 2023-07-10

## 2023-07-10 RX ADMIN — PIPERACILLIN AND TAZOBACTAM 3.38 G: 3; .375 INJECTION, POWDER, LYOPHILIZED, FOR SOLUTION INTRAVENOUS at 17:42

## 2023-07-10 RX ADMIN — Medication 1250 MG: at 13:14

## 2023-07-10 RX ADMIN — PIPERACILLIN AND TAZOBACTAM 3.38 G: 3; .375 INJECTION, POWDER, LYOPHILIZED, FOR SOLUTION INTRAVENOUS at 06:08

## 2023-07-10 RX ADMIN — Medication 1250 MG: at 01:22

## 2023-07-10 RX ADMIN — BUPIVACAINE HYDROCHLORIDE 2 ML: 2.5 INJECTION, SOLUTION EPIDURAL; INFILTRATION; INTRACAUDAL; PERINEURAL at 16:06

## 2023-07-10 RX ADMIN — PIPERACILLIN AND TAZOBACTAM 3.38 G: 3; .375 INJECTION, POWDER, LYOPHILIZED, FOR SOLUTION INTRAVENOUS at 12:00

## 2023-07-10 ASSESSMENT — ACTIVITIES OF DAILY LIVING (ADL)
ADLS_ACUITY_SCORE: 54
ADLS_ACUITY_SCORE: 56
ADLS_ACUITY_SCORE: 56
ADLS_ACUITY_SCORE: 54
ADLS_ACUITY_SCORE: 56
ADLS_ACUITY_SCORE: 54
ADLS_ACUITY_SCORE: 54
ADLS_ACUITY_SCORE: 56
ADLS_ACUITY_SCORE: 54
ADLS_ACUITY_SCORE: 56
ADLS_ACUITY_SCORE: 54
ADLS_ACUITY_SCORE: 56

## 2023-07-10 NOTE — CONSULTS
Interventional Radiology  [facility] Consult Service Note  07/10/23   8:52 AM    Consult Requested: Biopsy    Recommendations/Plan:    Patient will be added to IR schedule on 7/10/23 for a CT-guided right ischial tuberosity biopsy.     This is a 67 year old male with history of quadriplegia and prior DVT, now with right sacral decubitus ulcer. MR concerning for right-sided osteomyelitis involving ischial tuberosity and pubic ramus. Patient's team requesting biopsy.    Orders for diagnostics to be entered by requesting team (Medicine)    Labs WNL for procedure. INR is pending.  Preprocedural orders entered, as well as orders for procedure, NPO status.  Medications to be held include: Lovenox.  Consent will be done prior to procedure.     Please contact the IR charge RN at [phone] for estimated time of procedure.     Case and imaging discussed with IR attending, Dr. Reddy. Recommendations were reviewed with requesting team (DONI Hernandez MD).        Pertinent Imaging Reviewed: MR 7/6/23.    Expected date of discharge:  TBD.    Vitals:   /68 (BP Location: Right arm, Patient Position: Left side, Cuff Size: Adult Regular)   Pulse 70   Temp 98.2  F (36.8  C) (Oral)   Resp 18   Ht 1.829 m (6')   Wt 108.1 kg (238 lb 5.1 oz)   SpO2 99%   BMI 32.32 kg/m      Pertinent Labs:   Lab Results   Component Value Date    WBC 7.8 07/09/2023    WBC 9.3 07/07/2023     Lab Results   Component Value Date    HGB 11.6 07/09/2023    HGB 11.2 07/07/2023     Lab Results   Component Value Date     07/09/2023     07/07/2023     No results found for: INR, PTT  Lab Results   Component Value Date    POTASSIUM 3.6 07/09/2023        COVID-19 Antibody Results, Testing for Immunity         No data to display            COVID-19 PCR Results        9/24/2021    18:26 2/20/2023    15:01 7/6/2023    15:46   COVID-19 PCR Results   COVID-19 Virus by PCR (External Result) Negative     Negative     Negative         Details          This result is from an external source.             Liang Meehan PA-C  Interventional Radiology  Pager: [phone]

## 2023-07-10 NOTE — PLAN OF CARE
Goal Outcome Evaluation:    /68 (BP Location: Right arm, Patient Position: Left side, Cuff Size: Adult Regular)   Pulse 70   Temp 98.2  F (36.8  C) (Oral)   Resp 18   Ht 1.829 m (6')   Wt 108.1 kg (238 lb 5.1 oz)   SpO2 99%   BMI 32.32 kg/m     On oxygen at 2lpm via NC as desatts occasionally to 80s when deep asleep.  Wounds:  R IT drsg changed this shift.L IT stage 2 wound,KEYONA.  Blanchable redness on L lateral upper thigh area.Protective mepilex applied.Reinforced to not lay on side for long time as pt prefers laying on that side stating he sleeps better on L side.  Drains/lines:  PIV line L forearm for IV abx zosyn and vanco.  GI/:  Small BM this shift.Rodriguez with good amts.Good pericares done.  Activity:  Not OOB this shift.  Is quad,needing 2-3 staff assist with turns and repos. atleast every 2 hrs.Wife assists with staffs.  Plan:  Pt and wife requests to have bone biopsy done prior to any surgical intervention.

## 2023-07-10 NOTE — PLAN OF CARE
Steven Zvaala is a 67 year old male who is here for possible osteomyelitis infection.    NURSING ASSESSMENT:  Changes this shift:   - Bone biopsy completed. Pt returned from IR at approximately 1630. Pt reports being on L side for entirety of procedure.  - Q2-3 hour repositioning and weight shifting completed. Prevalon boots ordered for heel protection.  - Pt's wife at bedside for whole shift; updated on POC.    PAIN MANAGEMENT:   Pt denies pain.    NURSING PLAN:  Continue Q2 repositioning. Follow POC.    DISCHARGE DISPOSITION:  Estimated discharge date: TBD    Rose Bernstein RN

## 2023-07-10 NOTE — PROGRESS NOTES
[facility] ID SERVICE: Progress Note     Patient:  Steven Zavala   Date of birth 1955, Medical record number [id number]  Date of Visit:  07/10/2023  Date of Admission: 7/6/2023         Assessment and Recommendations:   ASSESSMENT:  1. Stage 4 R IT ulceration with MRI suggesting underlying osteo. Surgical teams have evaluated and do not see an indication for surgery. The mainstay of therapy for sacral osteo in the setting of stage 4 ulcer/pressure injury is wound care +/- debridement of non-viable bone which would be a nidus for infection, with a short course of antibiotics in the near term (~2 weeks); prolonged antibiotics in the setting of an open and deep wound would be a risk for selecting resistant growth. If there is non-viable bone, his response to a 2-week course of antibiotics would be compromised. If general surgery declines debridement at this time, one strategy would be to do a diagnostic bone biopsy looking for non-viable bone that should prompt reconsideration of debridement. An alternative approach is to give a 2 week course of antibiotics and if there is no improvement or worsening proceed with debridement (or biopsy, but debridement would be preferred).  It is a bit unusual that Steven would present with ulcers now after many years without decubiti. It is  suspected that his ~ 3 weeks with UTI symptoms and also his DVT may have precipitated this by precluding good offloading and producing transient hypoalbuminemia, among other probable physiologic changes.  2. L IT stage 2 ulceration, improving  3. 2/2023 K oxytoca UTI, resolved  4. 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 Continue pip/tazo for Morganella and vanco for GPC that appeared on gram stain from [facility] cultures that appear in our system under [facility]  2. Await pending cultures from 7/10 IR bone biopsy    Thanks for this consult. ID will  follow.    Yudy Patiño MD  Infectious Diseases  Pager [phone]  ________________________________________________________________        Interval History:   Doing well. Wife at bedside. Looking forward to discharge and hoping for no/short course antibiotics. No problems with current antibiotics.           History of Present Illness:     This is a 67-year-old man with past history of traumatic quadriplegia since 1980.  He is transferred from [facility] in [place] for management of sacral decubitus wounds.    Steven reports that he does not have a chronic history of wounds.  His recent past history was remarkable for the appearance of lower extremity spasms and malaise and February 2023.  He was ultimately diagnosed with UTI.  Urine culture done locally revealed 10-50,000 colonies Klebsiella oxytoca.  He received ciprofloxacin, and possibly a subsequent anti-infective given the duration of his symptoms which in total approximately 3 weeks; after this he did return to his prior baseline. Of note, the spasms he had with this UTI required him to spend more time in his chair than he would typically spend.    In late February 2023, he noticed leg swelling, was diagnosed with a DVT.  He saw oncology.  The conclusion was that this DVT in his lower extremity was provoked by UTI.  His oncologist reasoned that Steven's quadriplegia places him at elevated increased risk for future DVT and therefore recommended lifelong anticoagulation.  He is tolerating this well.    In the last several weeks, he has noted the appearance of bilateral wounds.  As above, he does not have a chronic history of wounds.  The left sacral wound began to show signs of healing, but the right wound has been progressive and in the recent past has produced increasing quantities of drainage.  He presented to [facility] on 7/5/23 where a wound culture was done that shows polymorphic organisms on Gram stain and on culture preliminarily shows  Morganella morganii. MRI showed: Pelvic MRI WO/W Contrast 7/5/23 [facility]: deep medial right buttock soft tissue ulceration measuring 3.4 x 2.8 x 6.5 cm extending into the right ischial tuberosity with surrounding edema. No drainable abscess. Findings consistent with osteomyelitis involving the inferior right pubic ramus and ischial tuberosity. Reactive right inguinal lymph nodes.   He was placed on empiric pip/tazo and vanco and transferred to [facility] for multidisciplinary input. Upon transfer to [facility] he was placed on empiric PIP Tazo and Vanco.   Plastic surgery has indicated there is no role for reconstruction at this time. General surgery's evaluation is pending.    In talking with Steven this afternoon, he feels relatively well.  No fever.  No malaise.  No spasm suggestive of urinary infection.    7-Day Micro Results     Procedure Component Value Units Date/Time    Bone Biopsy Aerobic Bacterial Culture Routine with Gram Stain     Order Status: Sent Lab Status: No result     Specimen: Bone Biopsy     Anaerobic Bacterial Culture Routine     Order Status: Sent Lab Status: No result     Specimen: Bone Biopsy     MRSA MSSA PCR, Nasal Swab [[id number]] Collected: 07/08/23 1433    Order Status: Completed Lab Status: Final result Updated: 07/08/23 1824    Specimen: Swab from Nare, Left      MRSA Target DNA Negative     SA Target DNA Positive    Narrative:      The [employer]  Xpert SA Nasal Complete assay performed in the [employer]  Dx System is a qualitative in vitro diagnostic test designed for rapid detection of Staphylococcus aureus (SA) and methicillin-resistant Staphylococcus aureus (MRSA) from nasal swabs in patients at risk for nasal colonization. The test utilizes automated real-time polymerase chain reaction (PCR) to detect MRSA/SA DNA. The Xpert SA Nasal Complete assay is intended to aid in the prevention and control of MRSA/SA infections in healthcare settings. The assay is not intended to diagnose, guide or  monitor treatment for MRSA/SA infections, or provide results of susceptibility to methicillin. A negative result does not preclude MRSA/SA nasal colonization.     Blood Culture Hand, Left [[id number]]  (Normal) Collected: 07/06/23 2128    Order Status: Completed Lab Status: Preliminary result Updated: 07/10/23 0447    Specimen: Blood from Hand, Left      Culture No growth after 3 days    Blood Culture Hand, Right [[id number]]  (Normal) Collected: 07/06/23 2128    Order Status: Completed Lab Status: Preliminary result Updated: 07/10/23 0447    Specimen: Blood from Hand, Right      Culture No growth after 3 days                Allergies:   No Known Allergies         Physical Exam:   Ranges for his vital signs:  Temp:  [98  F (36.7  C)-98.2  F (36.8  C)] 98.2  F (36.8  C)  Pulse:  [70-78] 78  Resp:  [18-19] 19  BP: (104-144)/(68-80) 138/80  SpO2:  [98 %-100 %] 100 %    Exam:  GENERAL:  Well-developed, well-nourished, sitting in bed in no acute distress.   ENT:  Head is normocephalic, atraumatic. Anterior oropharynx without ulcers.  EYES:  Eyes have anicteric sclerae.    NECK:  Supple.  LUNGS:  Normal respiratory effort.   ABDOMEN:  Non-distended.   EXT: Extremities without visible edema.   SKIN:  No acute rashes.  Line is in place without any surrounding erythema.  NEUROLOGIC:  Grossly nonfocal.          Laboratory Data:     Hematology Studies    Recent Labs   Lab Test 07/09/23 0921 07/07/23 0759   WBC 7.8 9.3   HGB 11.6* 11.2*   MCV 86 84    372     Metabolic Studies     Recent Labs   Lab Test 07/10/23  0919 07/09/23  0921 07/08/23  0540 07/07/23  0759 07/06/23  2250   NA  --  133* 132* 131*  --    POTASSIUM  --  3.6 4.0 4.9  --    CHLORIDE  --  100 101 97*  --    CO2  --  22 22 21*  --    BUN  --  18.4 18.6 18.1  --    CR 0.58* 0.58* 0.65*  0.61* 0.57* 0.59*   GFRESTIMATED >90 >90 >90  >90 >90 >90       Hepatic Studies    Recent Labs   Lab Test 07/07/23  0759   BILITOTAL 0.3   ALKPHOS 65   ALBUMIN 3.5    AST 22   ALT 42

## 2023-07-10 NOTE — PLAN OF CARE
VS: Vital signs:  Temp: 98  F (36.7  C) Temp src: Oral BP: (!) 144/75 Pulse: 71   Resp: 18 SpO2: 98 % O2 Device: None (Room air) Oxygen Delivery: 2 LPM Height: 182.9 cm (6') Weight: 108.1 kg (238 lb 5.1 oz)     O2: >95% on RA, No chest pain or SOB, All lung fields are clear   Output: Voiding spontaneously without difficulty, hodges cath   Last BM: 7/9/23, suppository given 7/9/23 @ 2105   Activity: Quadriplegic, bedfast   Skin: Visible skin intact,R  Ischaail tuberosity pressure injury Stage 4, L IT Stage 2, Hodges cath, L PIV   Pain: No pain rated this shift   CMS: A/O x 4, No numbness or tingling this shift   Dressing: Bilateral IT covered    Diet: Regular diet   LDA: L PIV, Hodges cath   Equipment: IV pole, personal belongings, call light within reach   Plan: Cont with POC   Additional Info:

## 2023-07-10 NOTE — PROGRESS NOTES
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 Patient initially presented to [facility] in [facility] on 05-Jul-2023. Patient was found to have right sacral decubitus ulcer and to have MRI findings consistent with osteomyelitis involving the inferior right pubic ramus and ischial tuberosity. Patient was transferred here for higher level of care.     Cultures at OSH reportedly growing Morganella and GPC.      Patient has hyponatremia. From review of labs in Care Everywhere, patient appears to have chronic hyponatremia.     P:  1.) Stage IV right ischial tuberosity pressure injury, present on admission:  - Surgery input appreciated. Discussed with patient.   - Patient wished to have bone biopsy before decision on debridement. Patient underwent bone biopsy by Interventional Radiology today.     2.) Osteomyelitis involving the inferior right pubic ramus and ischial tuberosity:  - Discussed with Infectious Disease. Patient empirically on vancomycin and zosyn at present. Awaiting further culture results. Awaiting culture results from bone biopsy.     3.) Quadriplegia:  - Monitoring for safety.     4.) Neurogenic bladder:  - Patient currently has hodges catheter in place.      5.) History of recurrent urinary tract infections:  - Patient asymptomatic for urinary tract infection at present. Monitoring for changes.     6.) Mild acute normocytic anemia:  - No indication for transfusion at present.     7.) Chronic nausea:  - Zofran as needed.     8.) Stage II left ischial tuberosity pressure injury, present on admission:  - Wound care.     9.) Hyponatremia, chronic:  - Sodium around baseline. No intervention indicated.     10.) Elevated blood pressure without  diagnosis of hypertension:  - IV hydralazine as needed.     11.) Personal history of DVT; patient on eliquis for anticoagulation as outpatient:  - Eliquis on hold in anticipation of procedures.  - Patient had been on lovenox 40 mg subcutaneous daily but this was held for bone biopsy. Will resume prophylactic lovenox tomorrow.       Diet: Snacks/Supplements Adult: [employer] Standard Oral Supplement; With Meals  Snacks/Supplements Adult: Oliver; With Meals  Regular Diet Adult    Rodriguez Catheter: PRESENT, indication: Neurogenic Bladder  Lines: None     Cardiac Monitoring: None  Code Status: Full Code      Clinically Significant Risk Factors               # Coagulation Defect: INR = 1.26 (Ref range: 0.85 - 1.15) and/or PTT = N/A, will monitor for bleeding           # Obesity: Estimated body mass index is 30.14 kg/m  as calculated from the following:    Height as of this encounter: 1.829 m (6').    Weight as of this encounter: 100.8 kg (222 lb 3.6 oz)., PRESENT ON ADMISSION          Disposition Plan      Expected Discharge Date: 07/13/2023                  Harlan Hernandez MD  Hospitalist Service, [address]  Securely message with [employer] (more info)  Text page via [facility] Paging/Directory   See signed in provider for up to date coverage information  ______________________________________________________________________    Interval History     Patient noted feeling well. Patient noted no fever, no chills and no dyspnea. Patient noted no new problems.    Physical Exam   Vital Signs: Temp: 97.7  F (36.5  C) Temp src: Oral BP: (!) 146/87 Pulse: 82   Resp: 20 SpO2: 100 % O2 Device: None (Room air)    Weight: 222 lbs 3.58 oz    General: Patient comfortable, NAD.  Heart: RRR, S1 S2 w/o murmurs.  Lungs: Breath sounds present. No crackles/wheezes heard.  Abdomen: Soft.    Medical Decision Making

## 2023-07-10 NOTE — PHARMACY-VANCOMYCIN DOSING SERVICE
Pharmacy Vancomycin Note  Date of Service July 10, 2023  Patient's  1955   67 year old, male    Indication: Osteomyelitis  Day of Therapy: since 23  Current vancomycin regimen:  1250 mg IV q12h  Current vancomycin monitoring method: AUC  Current vancomycin therapeutic monitoring goal: 400-600 mg*h/L    [employer] Prediction of Current Vancomycin Regimen  Loading dose: N/A  Regimen: 1250 mg IV every 12 hours.  Start time: 13:22 on 07/10/2023  Exposure target: AUC24 (range)400-600 mg/L.hr   AUC24,ss: 526 mg/L.hr  Probability of AUC24 > 400: 98 %  Ctrough,ss: 16.9 mg/L  Probability of Ctrough,ss > 20: 19 %  Probability of nephrotoxicity (Lodise LALIT ): 13 %      Current estimated CrCl = Estimated Creatinine Clearance: 161.3 mL/min (A) (based on SCr of 0.58 mg/dL (L)).    Creatinine for last 3 days  2023:  5:40 AM Creatinine 0.61 mg/dL;  5:40 AM Creatinine 0.65 mg/dL  2023:  9:21 AM Creatinine 0.58 mg/dL  7/10/2023:  9:19 AM Creatinine 0.58 mg/dL    Recent Vancomycin Levels (past 3 days)  2023:  5:40 AM Vancomycin 24.1 ug/mL  7/10/2023:  9:19 AM Vancomycin 21.4 ug/mL    Vancomycin IV Administrations (past 72 hours)                   vancomycin (VANCOCIN) 1,250 mg in 0.9% NaCl 250 mL intermittent infusion (mg) 1,250 mg New Bag 07/10/23 0122     1,250 mg New Bag 23 1301     1,250 mg New Bag 23 2318     1,250 mg New Bag  1315    vancomycin (VANCOCIN) 1,500 mg in 0.9% NaCl 250 mL intermittent infusion (mg) 1,500 mg New Bag 23 2341     1,500 mg New Bag  1232                Nephrotoxins and other renal medications (From now, onward)    Start     Dose/Rate Route Frequency Ordered Stop    23 1130  vancomycin (VANCOCIN) 1,250 mg in 0.9% NaCl 250 mL intermittent infusion         1,250 mg  over 90 Minutes Intravenous EVERY 12 HOURS 23 0828      23 2230  piperacillin-tazobactam (ZOSYN) 3.375 g vial to attach to  mL bag        Note to Pharmacy: For JOSELITO TORREZ and CORRIE:  "For Zosyn-naive patients, use the \"Zosyn initial dose + extended infusion\" order panel.    3.375 g  over 30 Minutes Intravenous EVERY 6 HOURS 07/06/23 2132               Contrast Orders - past 72 hours (72h ago, onward)    None          Interpretation of levels and current regimen:  Vancomycin level is reflective of -600    Has serum creatinine changed greater than 50% in last 72 hours: No    Urine output:  unable to determine    Renal Function: Stable      Plan:  1. Continue Current Dose vancomycin 1250 mg IV q12h  2. Vancomycin monitoring method: AUC  3. Vancomycin therapeutic monitoring goal: 400-600 mg*h/L  4. Pharmacy will check vancomycin levels as appropriate in 1-3 Days.  5. Serum creatinine levels will be ordered a minimum of twice weekly.    Vipin Wilcox [facility]  "

## 2023-07-10 NOTE — PROCEDURES
Interventional Radiology Brief Post Procedure Note    Procedure: Bone biopsy, right ischial tuberosity    Pre-procedure diagnosis: Concern for osteomyelitis    Post-procedure diagnosis: same    Proceduralist: Marta Reddy MD    Assistant: None    Time Out: Prior to the start of the procedure and with procedural staff participation, I verbally confirmed the patient s identity using two indicators, relevant allergies, that the procedure was appropriate and matched the consent or emergent situation, and that the correct equipment/implants were available. Immediately prior to starting the procedure I conducted the Time Out with the procedural staff and re-confirmed the patient s name, procedure, and site/side. (The Joint Commission universal protocol was followed.)  Yes        Sedation: None. Local Anesthestic used    Findings: 4 x 15 G core samples obtained, sent for culture and surg path as ordered by referring service    Estimated Blood Loss: Minimal    Fluoroscopy Time:  minute(s)    SPECIMENS: Fluid and/or tissue for laboratory analysis and culture    Complications: 1. None     Condition: Stable    Plan:   Recovery as ordered  Results pending    Comments: See dictated procedure note for full details.    Marta Reddy MD

## 2023-07-11 ENCOUNTER — HOME INFUSION (PRE-WILLOW HOME INFUSION) (OUTPATIENT)
Dept: PHARMACY | Facility: CLINIC | Age: 68
End: 2023-07-11
Payer: COMMERCIAL

## 2023-07-11 LAB — POTASSIUM SERPL-SCNC: 3.9 MMOL/L (ref 3.4–5.3)

## 2023-07-11 PROCEDURE — 84132 ASSAY OF SERUM POTASSIUM: CPT | Performed by: INTERNAL MEDICINE

## 2023-07-11 PROCEDURE — 99232 SBSQ HOSP IP/OBS MODERATE 35: CPT | Performed by: INTERNAL MEDICINE

## 2023-07-11 PROCEDURE — 250N000011 HC RX IP 250 OP 636: Performed by: INTERNAL MEDICINE

## 2023-07-11 PROCEDURE — 258N000003 HC RX IP 258 OP 636: Performed by: INTERNAL MEDICINE

## 2023-07-11 PROCEDURE — 36415 COLL VENOUS BLD VENIPUNCTURE: CPT | Performed by: INTERNAL MEDICINE

## 2023-07-11 PROCEDURE — 120N000002 HC R&B MED SURG/OB UMMC

## 2023-07-11 PROCEDURE — 250N000011 HC RX IP 250 OP 636: Mod: JZ | Performed by: INTERNAL MEDICINE

## 2023-07-11 PROCEDURE — 250N000011 HC RX IP 250 OP 636: Mod: JZ | Performed by: PHYSICIAN ASSISTANT

## 2023-07-11 RX ADMIN — PIPERACILLIN AND TAZOBACTAM 3.38 G: 3; .375 INJECTION, POWDER, LYOPHILIZED, FOR SOLUTION INTRAVENOUS at 00:11

## 2023-07-11 RX ADMIN — ENOXAPARIN SODIUM 40 MG: 40 INJECTION SUBCUTANEOUS at 19:22

## 2023-07-11 RX ADMIN — PIPERACILLIN AND TAZOBACTAM 3.38 G: 3; .375 INJECTION, POWDER, LYOPHILIZED, FOR SOLUTION INTRAVENOUS at 05:42

## 2023-07-11 RX ADMIN — Medication 1250 MG: at 12:44

## 2023-07-11 RX ADMIN — Medication 1250 MG: at 02:02

## 2023-07-11 RX ADMIN — PIPERACILLIN AND TAZOBACTAM 3.38 G: 3; .375 INJECTION, POWDER, LYOPHILIZED, FOR SOLUTION INTRAVENOUS at 19:22

## 2023-07-11 RX ADMIN — PIPERACILLIN AND TAZOBACTAM 3.38 G: 3; .375 INJECTION, POWDER, LYOPHILIZED, FOR SOLUTION INTRAVENOUS at 11:53

## 2023-07-11 ASSESSMENT — ACTIVITIES OF DAILY LIVING (ADL)
ADLS_ACUITY_SCORE: 56
ADLS_ACUITY_SCORE: 56
ADLS_ACUITY_SCORE: 58
ADLS_ACUITY_SCORE: 56
ADLS_ACUITY_SCORE: 58
ADLS_ACUITY_SCORE: 58
ADLS_ACUITY_SCORE: 56
ADLS_ACUITY_SCORE: 58
DEPENDENT_IADLS:: CLEANING;COOKING;LAUNDRY;MEAL PREPARATION;SHOPPING;MEDICATION MANAGEMENT;MONEY MANAGEMENT;TRANSPORTATION
ADLS_ACUITY_SCORE: 58
ADLS_ACUITY_SCORE: 58
ADLS_ACUITY_SCORE: 56
ADLS_ACUITY_SCORE: 58

## 2023-07-11 NOTE — PROGRESS NOTES
[facility] ID SERVICE: Progress Note     Patient:  Steven Zavala   Date of birth 1955, Medical record number [id number]  Date of Visit:  07/11/2023  Date of Admission: 7/6/2023         Assessment and Recommendations:   ASSESSMENT:  1. Stage 4 R IT ulceration with MRI suggesting underlying osteo. New ulcer for patient who has never had issues with them previously despite long-term quadriplegia.  - Wound culture at time of admission with Morganella, mixed dandre ([facility])  - Ortho recommended no urgent debridement  - Plastic surgery brief note recommended medical optimization and possible debridement and then formal consult later  - General surgery offered debridement vs. bone biopsy and trial of antibiotoics. Patient opted for latter option. Bone biopsy done 7/10 (after >72 hours broad spectrum abx). No growth to date. Pathology pending.  - at [facility] 7/5/23  2. L IT stage 2 ulceration, improving  3. 2/2023 K oxytoca UTI, resolved (fluoroquinolone resistant, TMP-SMX sensitive)  4. 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  As noted by Dr. Mcguire in her original ID consult note, recent studies have brought into question the utility of long-term antibiotics in the setting of chronic sacral osteomyelitis.  If there is not a clear plan for wound closure in patients with chronic osteomyelitis underlying sacral ulcers, 2 weeks of antibiotics are usually recommended to treat the localized infection.  For Mr. Zavala, I think our current goal should be resolution of the wound since this is a new issue for him.  Our ability to do this with antibiotics and local wound care depends somewhat on how much nonviable bone is present to the  base of the wound which we do not currently know.  Most important will be to pin down wound care plan, if/when debridement would still be recommended, and when to reconsult plastics if needed. In the meantime, we will tentatively plan for 6 week course antibiotics while monitoring wound healing and CRP for improvement.     For antibiotics, we will cover the Morganella as well as the Klebsiella recently in urine, but treatment will still be largely empiric. He does have coverage for home IV antibiotics, but I think we could also consider an oral regimen with good oral bioavailability (moxifloxacin + Bactrim would be one option).     RECOMMENDATION:  1. Recheck CRP prior to discharge (114 at [facility] 7/5/23)  2. Stop vancomycin since no evidence MRSA (done)  3. Continue pip/tazo for now  4. Please check EKG for possible initiation long course fluoroquinolones  5. Suspect we won't get additional info from bone biopsy culture, but pathology will still be helpful.  If nonviable bone is noted, will need to rediscuss need for debridement.  6. Will need two-pronged approach going forward with antibiotics and excellent wound care. Will need investigate wound care centers near [facility] ([facility]).  If not available, could possibly consider establishing care with [facility] wound center prior to returning home though logistics would be tricky. May still need surgical debridement going forward if not improving with conservative measures.   7. Plastic surgery brief note recommended interventions such as low pressure bed/chair upon discharge. Will need to ensure these are in place to aid wound healing and will be helpful to confirm exactly if and when plastic surgery should be reconsulted    ID will continue to follow.     Yudy Patiño MD  Infectious Diseases  Pager [phone]  ________________________________________________________________        Interval History:   Doing well. Wife at bedside. Looking forward to discharge.  No problems with current antibiotics.           History of Present Illness:     This is a 67-year-old man with past history of traumatic quadriplegia since 1980.  He is transferred from [facility] in [place] for management of sacral decubitus wounds.    Steven reports that he does not have a chronic history of wounds.  His recent past history was remarkable for the appearance of lower extremity spasms and malaise and February 2023.  He was ultimately diagnosed with UTI.  Urine culture done locally revealed 10-50,000 colonies Klebsiella oxytoca.  He received ciprofloxacin, and possibly a subsequent anti-infective given the duration of his symptoms which in total approximately 3 weeks; after this he did return to his prior baseline. Of note, the spasms he had with this UTI required him to spend more time in his chair than he would typically spend.    In late February 2023, he noticed leg swelling, was diagnosed with a DVT.  He saw oncology.  The conclusion was that this DVT in his lower extremity was provoked by UTI.  His oncologist reasoned that Steven's quadriplegia places him at elevated increased risk for future DVT and therefore recommended lifelong anticoagulation.  He is tolerating this well.    In the last several weeks, he has noted the appearance of bilateral wounds.  As above, he does not have a chronic history of wounds.  The left sacral wound began to show signs of healing, but the right wound has been progressive and in the recent past has produced increasing quantities of drainage.  He presented to [facility] on 7/5/23 where a wound culture was done that shows polymorphic organisms on Gram stain and on culture preliminarily shows Morganella morganii. MRI showed: Pelvic MRI WO/W Contrast 7/5/23 [facility]: deep medial right buttock soft tissue ulceration measuring 3.4 x 2.8 x 6.5 cm extending into the right ischial tuberosity with surrounding edema. No drainable abscess. Findings consistent with  osteomyelitis involving the inferior right pubic ramus and ischial tuberosity. Reactive right inguinal lymph nodes.   He was placed on empiric pip/tazo and vanco and transferred to [facility] for multidisciplinary input. Upon transfer to [facility] he was placed on empiric PIP Tazo and Vanco.   Plastic surgery has indicated there is no role for reconstruction at this time. General surgery's evaluation is pending.    In talking with Steven this afternoon, he feels relatively well.  No fever.  No malaise.  No spasm suggestive of urinary infection.    7-Day Micro Results     Procedure Component Value Units Date/Time    Bone Biopsy Aerobic Bacterial Culture Routine with Gram Stain [[id number]] Collected: 07/10/23 1559    Order Status: Completed Lab Status: Preliminary result Updated: 07/11/23 1259    Specimen: Bone Biopsy      Culture No growth, less than 1 day     Gram Stain Result No organisms seen      4+ WBC seen     Comment: Predominantly PMNs       Anaerobic Bacterial Culture Routine [[id number]] Collected: 07/10/23 1559    Order Status: Sent Lab Status: In process Updated: 07/10/23 1636    Specimen: Bone Biopsy     MRSA MSSA PCR, Nasal Swab [[id number]] Collected: 07/08/23 1433    Order Status: Completed Lab Status: Final result Updated: 07/08/23 1824    Specimen: Swab from Nare, Left      MRSA Target DNA Negative     SA Target DNA Positive    Narrative:      The [employer]  Xpert SA Nasal Complete assay performed in the [employer]  Dx System is a qualitative in vitro diagnostic test designed for rapid detection of Staphylococcus aureus (SA) and methicillin-resistant Staphylococcus aureus (MRSA) from nasal swabs in patients at risk for nasal colonization. The test utilizes automated real-time polymerase chain reaction (PCR) to detect MRSA/SA DNA. The Xpert SA Nasal Complete assay is intended to aid in the prevention and control of MRSA/SA infections in healthcare settings. The assay is not intended to diagnose, guide or  monitor treatment for MRSA/SA infections, or provide results of susceptibility to methicillin. A negative result does not preclude MRSA/SA nasal colonization.     Blood Culture Hand, Left [[id number]]  (Normal) Collected: 07/06/23 2128    Order Status: Completed Lab Status: Preliminary result Updated: 07/11/23 0447    Specimen: Blood from Hand, Left      Culture No growth after 4 days    Blood Culture Hand, Right [[id number]]  (Normal) Collected: 07/06/23 2128    Order Status: Completed Lab Status: Preliminary result Updated: 07/11/23 0447    Specimen: Blood from Hand, Right      Culture No growth after 4 days                Allergies:   No Known Allergies         Physical Exam:   Ranges for his vital signs:  Temp:  [97.7  F (36.5  C)-98.7  F (37.1  C)] 98.7  F (37.1  C)  Pulse:  [82-87] 87  Resp:  [17-20] 17  BP: ()/(53-87) 91/58  SpO2:  [100 %] 100 %    Exam:  GENERAL:  Well-developed, well-nourished, sitting in bed in no acute distress.   ENT:  Head is normocephalic, atraumatic. Anterior oropharynx without ulcers.  EYES:  Eyes have anicteric sclerae.    NECK:  Supple.  LUNGS:  Normal respiratory effort.   ABDOMEN:  Non-distended.   EXT: Extremities without visible edema.   SKIN:  No acute rashes.  Line is in place without any surrounding erythema.  NEUROLOGIC:  Grossly nonfocal.          Laboratory Data:     Hematology Studies    Recent Labs   Lab Test 07/09/23 0921 07/07/23 0759   WBC 7.8 9.3   HGB 11.6* 11.2*   MCV 86 84    372     Metabolic Studies     Recent Labs   Lab Test 07/11/23  0646 07/10/23  0919 07/09/23  0921 07/08/23  0540 07/07/23  0759 07/06/23  2250   NA  --   --  133* 132* 131*  --    POTASSIUM 3.9 4.0 3.6 4.0 4.9  --    CHLORIDE  --   --  100 101 97*  --    CO2  --   --  22 22 21*  --    BUN  --   --  18.4 18.6 18.1  --    CR  --  0.58* 0.58* 0.65*  0.61* 0.57* 0.59*   GFRESTIMATED  --  >90 >90 >90  >90 >90 >90       Hepatic Studies    Recent Labs   Lab Test 07/07/23  0758    BILITOTAL 0.3   ALKPHOS 65   ALBUMIN 3.5   AST 22   ALT 42

## 2023-07-11 NOTE — CONSULTS
Care Management Initial Consult    General Information  Assessment completed with: Patient,    Type of CM/SW Visit: Initial Assessment    Primary Care Provider verified and updated as needed: No   Readmission within the last 30 days: no previous admission in last 30 days      Reason for Consult: discharge planning (transfer from [facility] in [place])  Advance Care Planning: Advance Care Planning Reviewed: no concerns identified          Communication Assessment  Patient's communication style: spoken language (English or Bilingual)    Hearing Difficulty or Deaf: no   Wear Glasses or Blind: no    Cognitive  Cognitive/Neuro/Behavioral: WDL                      Living Environment:   People in home: spouse     Current living Arrangements: house      Able to return to prior arrangements: yes       Family/Social Support:  Care provided by: spouse/significant other, self  Provides care for: no one, unable/limited ability to care for self  Marital Status:   Wife, Children, Sibling(s)  [place]       Description of Support System: Supportive, Involved    Support Assessment: Adequate family and caregiver support, Adequate social supports    Current Resources:   Patient receiving home care services: No     Community Resources: None  Equipment currently used at home: lift device  Supplies currently used at home: Wound Care Supplies, Gloves, Incontinence Supplies    Employment/Financial:  Employment Status: disabled        Financial Concerns:   No concerns          Does the patient's insurance plan have a 3 day qualifying hospital stay waiver?  No    Lifestyle & Psychosocial Needs:  Social Determinants of Health     Tobacco Use: Not on file   Alcohol Use: Not on file   Financial Resource Strain: Not on file   Food Insecurity: Not on file   Transportation Needs: Not on file   Physical Activity: Not on file   Stress: Not on file   Social Connections: Not on file   Intimate Partner Violence: Not on file   Depression: Not on  file   Housing Stability: Not on file       Functional Status:  Prior to admission patient needed assistance:   Dependent ADLs:: Bathing, Dressing, Eating, Grooming, Incontinence  Dependent IADLs:: Cleaning, Cooking, Laundry, Meal Preparation, Shopping, Medication Management, Money Management, Transportation  Assesssment of Functional Status: At functional baseline    Mental Health Status:  Mental Health Status: No Current Concerns       Chemical Dependency Status:  Chemical Dependency Status: No Current Concerns             Values/Beliefs:  Spiritual, Cultural Beliefs, Congregation Practices, Values that affect care: no               Additional Information:  Per chart review:  Steven Zavala is a 67-year-old man with past history of traumatic quadriplegia since 1980.  He is transferred from [facility] in [place] for management of sacral decubitus wounds. He has PMH of quadraplegia since 1980, self cath, recent VTE on lifelong OAC per OSH Heme recs, and an ongoing sacral wound (appox 6 wks ago) that has now worsened, more drainage and would looking worse, on admission infectious and inflammatory markers under whelming but tunneling noted pelvic MRI showed R pubic ramus and ischial tuberosity with osteo.   Steven reports that he does not have a chronic history of wounds.  His recent past history was remarkable for the appearance of lower extremity spasms and malaise and February 2023.  He was ultimately diagnosed with UTI.  Urine culture done locally revealed 10-50,000 colonies Klebsiella oxytoca.  He received ciprofloxacin, and possibly a subsequent anti-infective given the duration of his symptoms which in total approximately 3 weeks; after this he did return to his prior baseline. Of note, the spasms he had with this UTI required him to spend more time in his chair than he would typically spend.    Plastic surgery has indicated there is no role for reconstruction at this time. General surgery's evaluation is  pending.    Per ID Note: awaiting results of CX to determine course of ABX    This RNCC met with patient at the bedside, patients spouse, clinton was present along with his sister. Patient states he lives in a house in [place] [facility] with his wife and has sons and daughters that live close by and are a great support. Patient states that he has never had IV ABX at home but is open to this as well as his spouse as she is aware that she would be the one administering. Patient is aware that we are waiting for cultures to come back before ID can recommend an ABX course.  Patient and spouse are okay with sending referral to [facility] for a benefit check. Family has a lift van and spouse will transport to home whe discharge is imminent,    Referral sent to [facility] for benefit check.-[facility], however may have a copay per dispense on the drug through pharmacy plan.    ID Note from 4:14 PM  For antibiotics, we will cover the Morganella as well as the Klebsiella recently in urine, but treatment will still be largely empiric. He does have coverage for home IV antibiotics, but I think we could also consider an oral regimen with good oral bioavailability (moxifloxacin + Bactrim would be one option).     Nell Cyr BSN RN CCM  RN Care Coordinator 8A and 10 ICU  [address] [place] [address]  [email or web address]   Office: (10 ICU) [phone]   Pager: [phone]    For Weekend & Holiday on call RN Care Coordinator:  (Tasks: Home care, home infusion, medical equipment/oxygen, transportation, IMM & CHERRY forms, etc.)   [facility] [place] [place] ([phone]) Saturday & Sunday; ([phone]) [facility] Holidays  Pager #1: [phone] Units: 4A, 4C, 4E, 5A & 5B   Pager #2: [phone] Units: 6A, 6B, 6C, 6D  Pager #3: [phone] Units: 7A, 7B, 7C, 7D & 5C   Pager #4: [phone] Units: 5 Ortho, 8A, 10 ICU, & [facility]      For Weekend & Holiday on call Social  Work:  (Tasks: TCU, transportation, Hospice, adjustment to illness counseling, Health Care Directives, Child Protection and Domestic Violence concerns, Vulnerable Adult, IMM forms, etc.)   [place] (0800 - 1630) Saturday and Sunday  Pager: [phone] Units: 4A, 4C, 4E, 5A and 5B   Pager: [phone] Units: 6A, 6B, 6C, 6D   Pager: [phone]: 7A, 7B, 7C, 7D, and 5C      [facility] ([phone]) Saturday and Sunday  Units: 5 Ortho, 8A, and 10 ICU   Pager: [phone]

## 2023-07-11 NOTE — PROGRESS NOTES
Therapy: IV abx    Insurance: [facility]    Pt has 100% coverage for IV abx with [facility], however may have a copay per dispense on the drug through pharmacy plan.    In reference to admission on 7/6/23 to check IV abx coverage      Please contact Intake with any questions, [phone] or In Basket pool, [facility] ([phone]).

## 2023-07-11 NOTE — PLAN OF CARE
Steven Zavala is a 68 year old male here for a possible osteomyelitis infection. Attempted to reposition every 2-3 hours. Wife stays the night and sister visited in the afternoon. Pt had one very loose BM this afternoon. Wound care completed on left IT wound. Zosyn and vanco given as scheduled. See flowsheet for detailed assessments.     Plan: Awaiting to hear bone biopsy (7/10) results, continue POC

## 2023-07-11 NOTE — PLAN OF CARE
Blood pressure 95/53, pulse 84, temperature 98.2  F (36.8  C), temperature source Oral, resp. rate 20, SpO2 100 %.     Respiratory/cardiac: Lungs clear. Denies SOB.    Neuro: A/O X4, Quadriplegic.    GI/: LBM 7/10. Rodriguez CDI.     Skin: Stage four pressure wound, CDI, dressing was changed done overnight. Stage two pressure wound, CDI. Pt turned every two hours.    LDA: L PIV SL.    Plan: Continue POC.

## 2023-07-11 NOTE — PROGRESS NOTES
[facility]    Medicine Progress Note - Hospitalist Service, GOLD TEAM 18    Date of Admission:  7/6/2023    Assessment & Plan   68 y/o man who has quadriplegia following a farming accident in 1980 and past DVT on lifelong anticoagulation. Patient initially presented to [facility] in [facility] on 05-Jul-2023. Patient was found to have right sacral decubitus ulcer and to have MRI findings consistent with osteomyelitis involving the inferior right pubic ramus and ischial tuberosity. Patient was transferred here for higher level of care.      Stage IV right ischial tuberosity pressure injury, present on admission:  Osteomyelitis involving the inferior right pubic ramus and ischial tuberosity:  - Surgery input appreciated. Discussed with patient.   - Patient wished to have bone biopsy before decision on debridement. Patient underwent bone biopsy by Interventional Radiology today.  - Discussed with Infectious Disease.   - Patient empirically on vancomycin and zosyn at present.   - Awaiting further culture results.   - Awaiting culture results from bone biopsy.    Quadriplegia with Neurogenic bladder  -continue to monitor  -hodges in place    History of recurrent UTIs  -asymptomatic    Chronic hyponatremia  -Monitor    Mild acute normocytic anemia  -monitor     Stage II left ischial tuberosity pressure injury, present on admission:  - Wound care.    Personal history of DVT; patient on eliquis for anticoagulation as outpatient:  - Eliquis on hold in anticipation of procedures.  - Patient had been on lovenox 40 mg subcutaneous daily but this was held for bone biopsy. Will resume prophylactic lovenox tomorrow.         Diet: Snacks/Supplements Adult: Mary Grace Vitrinepix Standard Oral Supplement; With Meals  Snacks/Supplements Adult: Oliver; With Meals  Regular Diet Adult    DVT Prophylaxis: Enoxaparin (Lovenox) SQ  Hodges Catheter: PRESENT, indication: Neurogenic  Bladder  Lines: None     Cardiac Monitoring: None  Code Status: Full Code      Clinically Significant Risk Factors               # Coagulation Defect: INR = 1.26 (Ref range: 0.85 - 1.15) and/or PTT = N/A, will monitor for bleeding           # Obesity: Estimated body mass index is 30.14 kg/m  as calculated from the following:    Height as of this encounter: 1.829 m (6').    Weight as of this encounter: 100.8 kg (222 lb 3.6 oz).           Disposition Plan     Expected Discharge Date: 07/13/2023                  Yojana Baltazar MD  Hospitalist Service, GOLD TEAM 18  [facility]  Securely message with [employer] (more info)  Text page via [facility] Paging/Directory   See signed in provider for up to date coverage information  ______________________________________________________________________    Interval History   Doing ok  Pending cultures  No NVD  No fevers or chills    Physical Exam   Vital Signs: Temp: 98.2  F (36.8  C) Temp src: Oral BP: 95/53 Pulse: 84   Resp: 20 SpO2: 100 % O2 Device: None (Room air)    Weight: 222 lbs 3.58 oz    General: No acute distress, breathing comfortably on room air  Neuro: EOMI, PERRLA. Facial muscles symmetric, strength/sensation grossly intact.  HEENT: Anicteric sclera. Oropharynx is clear. No lymphadenopathy.  Chest/Lungs: No accessory respiratory muscle use. Adequate air movement throughout. CTAB.  CV: Normal rate, regular rhythm. nl S1/S2. No m/r/g. Cap refill &lt;2s.  Abd: BS+. Soft, NTND.  Ext: Warm, well-perfused    Medical Decision Making       40 MINUTES SPENT BY ME on the date of service doing chart review, history, exam, documentation & further activities per the note.      Data     I have personally reviewed the following data over the past 24 hrs:    N/A  \   N/A   / N/A     N/A N/A N/A /  N/A   3.9 N/A 0.58 (L) \       INR:  1.26 (H) PTT:  N/A   D-dimer:  N/A Fibrinogen:  N/A       Imaging results reviewed over the past 24 hrs:    Recent Results (from the past 24 hour(s))   CT Bone Biopsy Deep    Narrative    PROCEDURES 7/10/2023:  1. CT guided right ischial tuberosity bone biopsy.     Clinical History: Concern for osteomyelitis. Biopsy requested.    Comparisons: MRI 7/5/2023    Staff Radiologist: Marta Manning M.D., interventional radiology  staff. I, Marta Manning, performed the entire procedure.    Medications:   0.5% bupivacaine for local anesthesia, 5 mL    Nursing: The patient was placed on continuous monitoring. No sedation  administered. The patient remained stable throughout the procedure.    Dose: 438 mGy*cm    PROCEDURE: The patient understood the limitations, alternatives, and  risks of the procedure and requested the procedure be performed. Both  written and oral consent were obtained.    Patient placed left lateral decubitus on procedure table. Targeted CT  scan performed demonstrated adequate position of right ischium for  biopsy.    The right buttock was prepped and draped in the usual sterile fashion.  0.5% bupivacaine was used for local anesthesia.     Under CT guidance, a 14 gauge coaxial Bonopty introducer needle was  advanced into the right ischial tuberosity. A total of 4, 15-gauge  core biopsies were obtained with and submitted for culture and  surgical pathology as requested.    Needles removed. Hemostasis was spontaneous. Sterile dressing applied.    No immediate complication.       Impression    IMPRESSION:   CT-guided biopsy right initial tuberosity as detailed above.  Laboratory results are pending.    MARTA MANNING MD         SYSTEM ID:  [id number]     Recent Labs   Lab 07/11/23  0646 07/10/23  1347 07/10/23  0919 07/09/23  0921 07/08/23  0540 07/07/23  0759   WBC  --   --   --  7.8  --  9.3   HGB  --   --   --  11.6*  --  11.2*   MCV  --   --   --  86  --  84   PLT  --   --   --  322  --  372   INR  --  1.26*  --   --   --   --    NA  --   --   --  133* 132* 131*   POTASSIUM 3.9  --  4.0 3.6 4.0 4.9    CHLORIDE  --   --   --  100 101 97*   CO2  --   --   --  22 22 21*   BUN  --   --   --  18.4 18.6 18.1   CR  --   --  0.58* 0.58* 0.65*  0.61* 0.57*   ANIONGAP  --   --   --  11 9 13   DONNY  --   --   --  8.8 8.6* 8.4*   GLC  --   --   --  121* 88 96   ALBUMIN  --   --   --   --   --  3.5   PROTTOTAL  --   --   --   --   --  7.0   BILITOTAL  --   --   --   --   --  0.3   ALKPHOS  --   --   --   --   --  65   ALT  --   --   --   --   --  42   AST  --   --   --   --   --  22

## 2023-07-12 VITALS
WEIGHT: 222.22 LBS | HEART RATE: 75 BPM | HEIGHT: 72 IN | DIASTOLIC BLOOD PRESSURE: 91 MMHG | RESPIRATION RATE: 16 BRPM | BODY MASS INDEX: 30.1 KG/M2 | TEMPERATURE: 98.5 F | OXYGEN SATURATION: 97 % | SYSTOLIC BLOOD PRESSURE: 151 MMHG

## 2023-07-12 LAB
BACTERIA BLD CULT: NO GROWTH
BACTERIA BLD CULT: NO GROWTH
CREAT SERPL-MCNC: 0.58 MG/DL (ref 0.67–1.17)
CRP SERPL-MCNC: 15.34 MG/L
GFR SERPL CREATININE-BSD FRML MDRD: >90 ML/MIN/1.73M2
PLATELET # BLD AUTO: 291 10E3/UL (ref 150–450)
POTASSIUM SERPL-SCNC: 3.7 MMOL/L (ref 3.4–5.3)

## 2023-07-12 PROCEDURE — 250N000011 HC RX IP 250 OP 636: Performed by: INTERNAL MEDICINE

## 2023-07-12 PROCEDURE — 85049 AUTOMATED PLATELET COUNT: CPT | Performed by: PHYSICIAN ASSISTANT

## 2023-07-12 PROCEDURE — 86140 C-REACTIVE PROTEIN: CPT | Performed by: INTERNAL MEDICINE

## 2023-07-12 PROCEDURE — 258N000003 HC RX IP 258 OP 636: Performed by: INTERNAL MEDICINE

## 2023-07-12 PROCEDURE — 99207 PR NO CHARGE LOS: CPT | Performed by: INTERNAL MEDICINE

## 2023-07-12 PROCEDURE — 82565 ASSAY OF CREATININE: CPT

## 2023-07-12 PROCEDURE — 84132 ASSAY OF SERUM POTASSIUM: CPT | Performed by: INTERNAL MEDICINE

## 2023-07-12 PROCEDURE — 99239 HOSP IP/OBS DSCHRG MGMT >30: CPT | Performed by: INTERNAL MEDICINE

## 2023-07-12 PROCEDURE — 93005 ELECTROCARDIOGRAM TRACING: CPT

## 2023-07-12 PROCEDURE — 36415 COLL VENOUS BLD VENIPUNCTURE: CPT

## 2023-07-12 PROCEDURE — 250N000011 HC RX IP 250 OP 636: Mod: JZ | Performed by: PHYSICIAN ASSISTANT

## 2023-07-12 PROCEDURE — 99233 SBSQ HOSP IP/OBS HIGH 50: CPT | Performed by: INTERNAL MEDICINE

## 2023-07-12 PROCEDURE — 93010 ELECTROCARDIOGRAM REPORT: CPT | Performed by: INTERNAL MEDICINE

## 2023-07-12 RX ORDER — LEVOFLOXACIN 750 MG/1
750 TABLET, FILM COATED ORAL DAILY
Qty: 35 TABLET | Refills: 0 | Status: SHIPPED | OUTPATIENT
Start: 2023-07-12 | End: 2023-08-16

## 2023-07-12 RX ORDER — METRONIDAZOLE 500 MG/1
500 TABLET ORAL 3 TIMES DAILY
Qty: 105 TABLET | Refills: 0 | Status: SHIPPED | OUTPATIENT
Start: 2023-07-12 | End: 2023-08-16

## 2023-07-12 RX ADMIN — PIPERACILLIN AND TAZOBACTAM 3.38 G: 3; .375 INJECTION, POWDER, LYOPHILIZED, FOR SOLUTION INTRAVENOUS at 00:42

## 2023-07-12 RX ADMIN — Medication 1250 MG: at 02:09

## 2023-07-12 RX ADMIN — PIPERACILLIN AND TAZOBACTAM 3.38 G: 3; .375 INJECTION, POWDER, LYOPHILIZED, FOR SOLUTION INTRAVENOUS at 12:25

## 2023-07-12 RX ADMIN — PIPERACILLIN AND TAZOBACTAM 3.38 G: 3; .375 INJECTION, POWDER, LYOPHILIZED, FOR SOLUTION INTRAVENOUS at 05:57

## 2023-07-12 ASSESSMENT — ACTIVITIES OF DAILY LIVING (ADL)
ADLS_ACUITY_SCORE: 58

## 2023-07-12 NOTE — PROGRESS NOTES
CARE MANAGEMENT      7/12-CHW completed the IMM with the Patient. He signed and received the form with the assistance of his wife.    Griffin DUNCAN-[facility] Worker

## 2023-07-12 NOTE — DISCHARGE INSTRUCTIONS
Right IT wound: BID and as needed if soiled with urine and/or stool: Remove packing and flush wound with 10 ml of Vashe (order #[id number]). Pack wound to full depth (7 cm) with AMD gauze (order #[id number]). Cover with ABD and secure with Primapore tape.    Left IT wound: Daily: Wash wound with Vashe (order #[id number]) and gauze. No cover dressing indicated if patient is on bedrest. If patient is up in wheelchair, please cover wound with Mepilex.

## 2023-07-12 NOTE — PLAN OF CARE
BP (!) 141/77 (BP Location: Right arm)   Pulse 71   Temp 98  F (36.7  C) (Oral)   Resp 18   Ht 1.829 m (6')   Wt 100.8 kg (222 lb 3.6 oz)   SpO2 95%   BMI 30.14 kg/m    A & O X 4  Vitals remain stable on RA  Denies respiratory distress, NV, N/T  Repositioned 2 hrly Pt declined X2 .  Pt requested for oxygen to sleep. RN educated pt. That his O2 saturation is within acceptable parameters (within 95 and 98 throughout the shift) and does not require oxygen to aide sleep. Pt. Verbalized understanding.   PIV patent to administer I/V medications.   No BM this shift. Rodriguez in place draining clear yellow urine   Pt denies pain  Pt is on RN K+ protocol. Lab drawn this AM  Wife slept in room with Pt.  Frequent rounds to Pt's room to ensure safety. Will continue with POC.

## 2023-07-12 NOTE — PROGRESS NOTES
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 Patient initially presented to [facility] in [facility] on 05-Jul-2023. Patient was found to have right sacral decubitus ulcer and to have MRI findings consistent with osteomyelitis involving the inferior right pubic ramus and ischial tuberosity. Patient was transferred here for higher level of care.      Stage IV right ischial tuberosity pressure injury, present on admission:  Osteomyelitis involving the inferior right pubic ramus and ischial tuberosity:  - Surgery input appreciated. Discussed with patient.   - s/p bone biopsy with IR on 7/10 - cultures pending  - ID following   - c/w vancomycin and zosyn (7/6-), vancomycin discontinued  - follow up ECG for possible FQ on discharge  - needs very good wound care set up   - Awaiting further culture results.   - Awaiting culture results from bone biopsy.    Quadriplegia with Neurogenic bladder  -continue to monitor  -hodges in place    History of recurrent UTIs  -asymptomatic    Chronic hyponatremia  -Monitor    Mild acute normocytic anemia  -monitor     Stage II left ischial tuberosity pressure injury, present on admission:  - Wound care.    Personal history of DVT; patient on eliquis for anticoagulation as outpatient:  - Eliquis on hold in anticipation of procedures.  - Patient had been on lovenox 40 mg subcutaneous daily but this was held for bone biopsy. Will resume prophylactic lovenox tomorrow.         Diet: Snacks/Supplements Adult: Mary Grace Fernandez Standard Oral Supplement; With Meals  Snacks/Supplements Adult: Oliver; With Meals  Regular Diet Adult    DVT Prophylaxis: Enoxaparin (Lovenox) SQ  Hodges Catheter: PRESENT, indication: Neurogenic Bladder  Lines: None      Cardiac Monitoring: None  Code Status: Full Code      Clinically Significant Risk Factors               # Coagulation Defect: INR = 1.26 (Ref range: 0.85 - 1.15) and/or PTT = N/A, will monitor for bleeding           # Obesity: Estimated body mass index is 30.14 kg/m  as calculated from the following:    Height as of this encounter: 1.829 m (6').    Weight as of this encounter: 100.8 kg (222 lb 3.6 oz).           Disposition Plan      Expected Discharge Date: 07/13/2023                  Yojana Baltazar MD  Hospitalist Service, GOLD TEAM 18  [facility]  Securely message with [employer] (more info)  Text page via [facility] Paging/Directory   See signed in provider for up to date coverage information  ______________________________________________________________________    Interval History   Feeling okay  No NVD  No chest pain  Excited about potential to go home     Physical Exam   Vital Signs: Temp: 98  F (36.7  C) Temp src: Oral BP: (!) 141/77 Pulse: 71   Resp: 18 SpO2: 95 % O2 Device: None (Room air)    Weight: 222 lbs 3.58 oz    General: No acute distress, breathing comfortably on room air  Neuro: EOMI, PERRLA. Facial muscles symmetric, strength/sensation grossly intact.  HEENT: Anicteric sclera. Oropharynx is clear. No lymphadenopathy.  Chest/Lungs: No accessory respiratory muscle use. Adequate air movement throughout. CTAB.  CV: Normal rate, regular rhythm. nl S1/S2. No m/r/g. Cap refill &lt;2s.  Abd: BS+. Soft, NTND.  Ext: Warm, well-perfused    Medical Decision Making       40 MINUTES SPENT BY ME on the date of service doing chart review, history, exam, documentation & further activities per the note.      Data     I have personally reviewed the following data over the past 24 hrs:    N/A  \   N/A   / 291     N/A N/A N/A /  N/A   3.7 N/A 0.58 (L) \       Imaging results reviewed over the past 24 hrs:   No results found for this or any previous visit (from the past 24  hour(s)).  Recent Labs   Lab 07/12/23  0549 07/11/23  0646 07/10/23  1347 07/10/23  0919 07/09/23  0921 07/08/23  0540 07/07/23  0759   WBC  --   --   --   --  7.8  --  9.3   HGB  --   --   --   --  11.6*  --  11.2*   MCV  --   --   --   --  86  --  84     --   --   --  322  --  372   INR  --   --  1.26*  --   --   --   --    NA  --   --   --   --  133* 132* 131*   POTASSIUM 3.7 3.9  --  4.0 3.6 4.0 4.9   CHLORIDE  --   --   --   --  100 101 97*   CO2  --   --   --   --  22 22 21*   BUN  --   --   --   --  18.4 18.6 18.1   CR 0.58*  --   --  0.58* 0.58* 0.65*  0.61* 0.57*   ANIONGAP  --   --   --   --  11 9 13   DONNY  --   --   --   --  8.8 8.6* 8.4*   GLC  --   --   --   --  121* 88 96   ALBUMIN  --   --   --   --   --   --  3.5   PROTTOTAL  --   --   --   --   --   --  7.0   BILITOTAL  --   --   --   --   --   --  0.3   ALKPHOS  --   --   --   --   --   --  65   ALT  --   --   --   --   --   --  42   AST  --   --   --   --   --   --  22

## 2023-07-12 NOTE — PLAN OF CARE
Pt remains alert and oriented x 4; On RA; LS clear; Reports no SOB, N/V    Patient is  Quadriplegic. Bedrest; Q 2-3 hrs repositioning in bed.      LBM 7/11; Loose . Rodriguez in place and patent.      Wound dressing to Right IT completed as ordered.     L PIV infusing TKO.    Pt is on K RN management protocol.     Plan: Continue with the current POC. Awaiting biopsy results.

## 2023-07-12 NOTE — PROGRESS NOTES
Care Management Discharge Note    Discharge Date: 07/13/2023     Discharge Disposition: Home     Discharge Services: None    Discharge DME: None    Discharge Transportation: family or friend will provide (Family has lift Van)    Private pay costs discussed: Not applicable    Does the patient's insurance plan have a 3 day qualifying hospital stay waiver?  No    PAS Confirmation Code: N/A  Patient/family educated on Medicare website which has current facility and service quality ratings: no    Education Provided on the Discharge Plan: Yes  Persons Notified of Discharge Plans: patient and spouse  Patient/Family in Agreement with the Plan: yes    Handoff Referral Completed: Yes- external     Additional Information:  Per team, patient is medically ready for discharge. Met with patient and his wife, Katie. Katie stated that she is comfortable doing patient's wound care. Bedside nurse was in room during conversation and will be sending some wound care supplies home with them. Hospitalist spoke to [facility] about wound care follow up/referral. They requested that the order be faxed to: [phone] (done). Patient and Katie are aware.     Plastic surgery is recommending a low pressure bed/chair upon discharge. Discussed with patient/Katie and they currently have a regular bed with a sleep number mattress. His wheelchair has a roho cushion. He stated that it is 4 years old so he is in the process of getting a new one. Informed hospitalist who stated that DME needs can be addressed at his OP wound clinic visit.     Updated [facility] liaison that patient will be discharging on oral abx.     No other care coordination needs anticipated.     MOR Wilson  RN Care Coordinator   Office: [phone]   Pager: [phone]

## 2023-07-12 NOTE — PROGRESS NOTES
[facility] ID SERVICE: Sign Off Note     Patient:  Steven Zavala   Date of birth 1955, Medical record number [id number]  Date of Visit:  07/12/2023  Date of Admission: 7/6/2023         Assessment and Recommendations:   ASSESSMENT:  1. Stage 4 R IT ulceration with MRI suggesting underlying osteo. New ulcer for patient who has never had issues with them previously despite long-term quadriplegia.  - Wound culture at time of admission with Morganella, mixed dandre ([facility])  - Ortho recommended no urgent debridement  - Plastic surgery brief note recommended medical optimization and possible debridement and then formal consult later  - General surgery offered debridement vs. bone biopsy and trial of antibiotoics. Patient opted for latter option. Bone biopsy done 7/10 (after >72 hours broad spectrum abx). No growth to date. Pathology pending.  - at [facility] 7/5/23  2. L IT stage 2 ulceration, improving  3. 2020 K oxytoca UTI  (fluoroquinolone resistant, TMP-SMX sensitive). 2023 urine culture negative.   4. 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  As noted by Dr. Mcguire in her original ID consult note, recent studies have brought into question the utility of long-term antibiotics in the setting of chronic sacral osteomyelitis.  If there is not a clear plan for wound closure in patients with chronic osteomyelitis underlying sacral ulcers, 2 weeks of antibiotics are usually recommended to treat the localized infection.  For Mr. Zavala, I think our current goal should be resolution of the wound since this is a new issue for him.  Our ability to do this with antibiotics and local wound care depends somewhat on how much nonviable bone  is present to the base of the wound which we do not currently know.  Most important will be to pin down wound care plan, if/when debridement would still be recommended, and when to reconsult plastics if needed. In the meantime, we will tentatively plan for 6 week course antibiotics while monitoring wound healing and CRP for improvement.     For antibiotics, we will cover the Morganella as well as the Klebsiella recently in urine, but treatment will still be largely empiric. He does have coverage for home IV antibiotics, but I think we could also consider an oral regimen with good oral bioavailability (moxifloxacin + Bactrim would be one option).     RECOMMENDATION:  1. Ok for discharge pending final path and micro from ID standpoint  2. Upon discharge, stop pip/tazo  3. Start levofloxacin 750 mg PO daily and metronidazole 500 mg PO Q8H to complete 6 weeks total antibiotics (through ~8/16/23). Side effects discussed. Will need to be especially vigilant due to quadriplegia making it difficult to assess for tendinopathy.   4. Please arrange for lab check in 1-2 weeks for CBC with diff, CRP. Fax results to me at [phone]  5. If CRP rises consistently after antibiotic change or wound isn't ipmroving will need re-assesment for debridement and would consider changing empirically to an option that covers his old fluoroquinolone-restant Klebsiella from 2020 (Bactrim + moxifloxacin, or Augmentin + Bactrim).   6. Arranging wound care clinic in [place] much appreciated!   7. Follow-up with me for a video visit 8/3/23 at 12:00    Yudy Patiño MD  Infectious Diseases  Pager [phone]  ________________________________________________________________        Interval History:   Doing well. Wife at bedside. Looking forward to discharge. No problems with current antibiotics.           History of Present Illness:     This is a 67-year-old man with past history of traumatic quadriplegia since 1980.  He is transferred from [facility] in [place] for management of sacral decubitus wounds.    Steven reports that he does not have a chronic history of wounds.  His recent past history was remarkable for the appearance of lower extremity spasms and malaise and February 2023.  He was ultimately diagnosed with UTI.  Urine culture done locally revealed 10-50,000 colonies Klebsiella oxytoca.  He received ciprofloxacin, and possibly a subsequent anti-infective given the duration of his symptoms which in total approximately 3 weeks; after this he did return to his prior baseline. Of note, the spasms he had with this UTI required him to spend more time in his chair than he would typically spend.    In late February 2023, he noticed leg swelling, was diagnosed with a DVT.  He saw oncology.  The conclusion was that this DVT in his lower extremity was provoked by UTI.  His oncologist reasoned that Steven's quadriplegia places him at elevated increased risk for future DVT and therefore recommended lifelong anticoagulation.  He is tolerating this well.    In the last several weeks, he has noted the appearance of bilateral wounds.  As above, he does not have a chronic history of wounds.  The left sacral wound began to show signs of healing, but the right wound has been progressive and in the recent past has produced increasing quantities of drainage.  He presented to [facility] on 7/5/23 where a wound culture was done that shows polymorphic organisms on Gram stain and on culture preliminarily shows Morganella morganii. MRI showed: Pelvic MRI WO/W Contrast 7/5/23 [facility]: deep medial right buttock soft tissue ulceration measuring 3.4 x 2.8 x 6.5 cm extending into the right ischial tuberosity with surrounding edema. No drainable abscess. Findings consistent with osteomyelitis involving the inferior right pubic ramus and ischial tuberosity. Reactive right inguinal lymph nodes.   He was placed on empiric pip/tazo and vanco and transferred to [facility] for  multidisciplinary input. Upon transfer to [facility] he was placed on empiric PIP Tazo and Vanco.   Plastic surgery has indicated there is no role for reconstruction at this time. General surgery's evaluation is pending.    In talking with Steven this afternoon, he feels relatively well.  No fever.  No malaise.  No spasm suggestive of urinary infection.    7-Day Micro Results     Procedure Component Value Units Date/Time    Bone Biopsy Aerobic Bacterial Culture Routine with Gram Stain [[id number]] Collected: 07/10/23 1559    Order Status: Completed Lab Status: Preliminary result Updated: 07/12/23 1027    Specimen: Bone Biopsy      Culture No growth after 1 day     Gram Stain Result No organisms seen      4+ WBC seen     Comment: Predominantly PMNs       Anaerobic Bacterial Culture Routine [[id number]] Collected: 07/10/23 1559    Order Status: Completed Lab Status: Preliminary result Updated: 07/11/23 2118    Specimen: Bone Biopsy      Culture No anaerobic organisms isolated after 1 day    MRSA MSSA PCR, Nasal Swab [[id number]] Collected: 07/08/23 1433    Order Status: Completed Lab Status: Final result Updated: 07/08/23 1824    Specimen: Swab from Nare, Left      MRSA Target DNA Negative     SA Target DNA Positive    Narrative:      [address]  Xpert SA Nasal Complete assay performed in the [employer]  Dx System is a qualitative in vitro diagnostic test designed for rapid detection of Staphylococcus aureus (SA) and methicillin-resistant Staphylococcus aureus (MRSA) from nasal swabs in patients at risk for nasal colonization. The test utilizes automated real-time polymerase chain reaction (PCR) to detect MRSA/SA DNA. The Xpert SA Nasal Complete assay is intended to aid in the prevention and control of MRSA/SA infections in healthcare settings. The assay is not intended to diagnose, guide or monitor treatment for MRSA/SA infections, or provide results of susceptibility to methicillin. A negative result does not  preclude MRSA/SA nasal colonization.     Blood Culture Hand, Left [[id number]]  (Normal) Collected: 07/06/23 2128    Order Status: Completed Lab Status: Final result Updated: 07/12/23 0447    Specimen: Blood from Hand, Left      Culture No Growth    Blood Culture Hand, Right [[id number]]  (Normal) Collected: 07/06/23 2128    Order Status: Completed Lab Status: Final result Updated: 07/12/23 0447    Specimen: Blood from Hand, Right      Culture No Growth                Allergies:   No Known Allergies         Physical Exam:   Ranges for his vital signs:  Temp:  [98  F (36.7  C)-99.2  F (37.3  C)] 98.5  F (36.9  C)  Pulse:  [71-88] 75  Resp:  [16-18] 16  BP: (141-168)/(77-93) 151/91  SpO2:  [95 %-97 %] 97 %    Exam:  GENERAL:  Well-developed, well-nourished, sitting in bed in no acute distress.   ENT:  Head is normocephalic, atraumatic. Anterior oropharynx without ulcers.  EYES:  Eyes have anicteric sclerae.    NECK:  Supple.  LUNGS:  Normal respiratory effort.   ABDOMEN:  Non-distended.   EXT: Extremities without visible edema.   SKIN:  No acute rashes.  Line is in place without any surrounding erythema.  NEUROLOGIC:  Grossly nonfocal.          Laboratory Data:     Hematology Studies    Recent Labs   Lab Test 07/12/23  0549 07/09/23  0921 07/07/23  0759   WBC  --  7.8 9.3   HGB  --  11.6* 11.2*   MCV  --  86 84    322 372     Metabolic Studies     Recent Labs   Lab Test 07/12/23  0549 07/11/23  0646 07/10/23  0919 07/09/23  0921 07/08/23  0540 07/07/23  0759   NA  --   --   --  133* 132* 131*   POTASSIUM 3.7 3.9 4.0 3.6 4.0 4.9   CHLORIDE  --   --   --  100 101 97*   CO2  --   --   --  22 22 21*   BUN  --   --   --  18.4 18.6 18.1   CR 0.58*  --  0.58* 0.58* 0.65*  0.61* 0.57*   GFRESTIMATED >90  --  >90 >90 >90  >90 >90       Hepatic Studies    Recent Labs   Lab Test 07/07/23  0759   BILITOTAL 0.3   ALKPHOS 65   ALBUMIN 3.5   AST 22   ALT 42

## 2023-07-12 NOTE — DISCHARGE SUMMARY
"[facility]    Discharge Summary  Hospitalist    Date of Admission:  7/6/2023  Date of Discharge:  7/12/2023    Discharge Diagnoses   Subacute osteomyelitis, other site (H)    History of Present Illness   \"Steven Zavala is a 67 year old male who is seen at the bedside after transfer from [facility] in [place].  He is here for management of a sacral wound with osteomyelitis.  He does not have sensation to the region so he denies any pain to the area.  He denies any recent fevers, chills, known bleeding.  He has had regular stools.  He denies any new or significant symptoms including pain to other areas of the body.  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 Discussed with patient.   - s/p bone biopsy with IR on 7/10 - cultures pending  - ID following   - c/w vancomycin and zosyn (7/6-7/12), vancomycin discontinued  - ECG qtc 457; 750mg daily Levaquin until 8/16, 500mg TID Flagyl until 8/16  - CBC/CRP ordered -- results to be followed by ID   - Plastic surgery follow up referral placed  - Wound care referral placed with [facility] in [place], [place]  - Awaiting further culture results.   - Awaiting culture results from bone biopsy.     Quadriplegia with Neurogenic bladder  -continue to monitor  -hodges in place     History of recurrent UTIs  -asymptomatic     Chronic hyponatremia  -Monitor     Mild acute normocytic anemia  -monitor      Stage II left ischial tuberosity pressure injury, present on admission:  - Wound care.     Personal history of DVT; patient on eliquis for anticoagulation as outpatient:  - Eliquis on hold in anticipation of procedures, resumed on discharge    - Patient had been on lovenox 40 mg subcutaneous daily but this was held for bone " biopsy.   Yojana Baltazar MD     Significant Results and Procedures   Bone biopsy 7/10    Pending Results   These results will be followed up by ID  Unresulted Labs Ordered in the Past 30 Days of this Admission     Date and Time Order Name Status Description    7/10/2023  8:37 AM Anaerobic Bacterial Culture Routine Preliminary     7/10/2023  8:37 AM Bone Biopsy Aerobic Bacterial Culture Routine with Gram Stain Preliminary     7/10/2023  8:30 AM Surgical Pathology Exam In process         Code Status   Full Code       Primary Care Physician   Merline Manley    Physical Exam   Temp: 98.5  F (36.9  C) Temp src: Oral BP: (!) 151/91 Pulse: 75   Resp: 16 SpO2: 97 % O2 Device: None (Room air)    Vitals:    07/06/23 2300 07/10/23 1136   Weight: 108.1 kg (238 lb 5.1 oz) 100.8 kg (222 lb 3.6 oz)     Vital Signs with Ranges  Temp:  [98  F (36.7  C)-99.2  F (37.3  C)] 98.5  F (36.9  C)  Pulse:  [71-88] 75  Resp:  [16-18] 16  BP: (141-168)/(77-93) 151/91  SpO2:  [95 %-97 %] 97 %  I/O last 3 completed shifts:  In: 80 [I.V.:80]  Out: 1100 [Urine:1100]    Constitutional: Awake, alert, cooperative, no apparent distress  Respiratory: Clear to auscultation bilaterally, no crackles or wheezing  Cardiovascular: Regular rate and rhythm, normal S1 and S2, and no murmur noted  GI: Normal bowel sounds, soft, non-distended, non-tender  Skin/Integumen: No rashes, no cyanosis, no edema  Other:     Discharge Disposition   Discharged to home  Condition at discharge: Stable    Consultations This Hospital Stay   WOUND OSTOMY CONTINENCE NURSE  IP CONSULT  INFECTIOUS DISEASE [facility] ADULT IP CONSULT  ORTHOPAEDIC SURGERY ADULT/PEDS IP CONSULT  PLASTIC SURGERY IP CONSULT  NUTRITION SERVICES ADULT IP CONSULT  PHARMACY TO DOSE VANCO  NUTRITION SERVICES ADULT IP CONSULT  SURGERY GENERAL ADULT IP CONSULT  INTERVENTIONAL RADIOLOGY ADULT/PEDS IP CONSULT  CARE MANAGEMENT / SOCIAL WORK IP CONSULT    Time Spent on this Encounter   I, Yojana Baltazar  MD, personally saw the patient today and spent greater than 30 minutes discharging this patient.    Discharge Orders      CRP, inflammation    Please arrange for lab check in 1-2 weeks for CBC with diff, CRP. Fax results to Dr Patiño at [phone]     Wound Care Referral      Adult Plastic Surgery  Referral      Reason for your hospital stay    Ulcer/infection     Activity    Your activity upon discharge: activity as tolerated     Adult [facility] Follow-up and recommended labs and tests    Follow up with primary care provider, Merline Manley, within 7 days for hospital follow- up.  No follow up labs or test are needed.      Appointments on [place] and/or [facility] (with [facility] or [facility] provider or service). Call [phone] if you haven't heard regarding these appointments within 7 days of discharge.     Diet    Follow this diet upon discharge: Orders Placed This Encounter      Snacks/Supplements Adult: [employer] Standard Oral Supplement; With Meals      Snacks/Supplements Adult: Oliver; With Meals      Regular Diet Adult     CBC with platelets and differential    Please arrange for lab check in 1-2 weeks for CBC with diff, CRP. Fax results to Dr Patiño at [phone]     Discharge Medications   Current Discharge Medication List      START taking these medications    Details   levofloxacin (LEVAQUIN) 750 MG tablet Take 1 tablet (750 mg) by mouth daily for 35 days  Qty: 35 tablet, Refills: 0    Associated Diagnoses: Subacute osteomyelitis, other site (H)      metroNIDAZOLE (FLAGYL) 500 MG tablet Take 1 tablet (500 mg) by mouth 3 times daily for 35 days  Qty: 105 tablet, Refills: 0    Associated Diagnoses: Subacute osteomyelitis, other site (H)         CONTINUE these medications which have NOT CHANGED    Details   apixaban ANTICOAGULANT (ELIQUIS) 5 MG tablet Take 5 mg by mouth 2 times daily      ibuprofen (ADVIL/MOTRIN) 200 MG tablet Take 400 mg by mouth every 6 hours as needed for pain       ondansetron (ZOFRAN) 8 MG tablet Take 8 mg by mouth every 8 hours as needed for nausea      !! UNABLE TO FIND Take 2 tablets by mouth daily MEDICATION NAME: Uqora (Urinary Health - Reduces UTI risk)      !! UNABLE TO FIND Take 1 packet by mouth 3 times daily MEDICATION NAME: Power Mannose Powder      !! UNABLE TO FIND Take 1 Dose by mouth 2 times daily MEDICATION NAME: Oliver Powder (Therapeutic Nutrition Powder)       !! - Potential duplicate medications found. Please discuss with provider.        Allergies   No Known Allergies  Data   Most Recent 3 CBC's:  Recent Labs   Lab Test 07/12/23  0549 07/09/23  0921 07/07/23  0759   WBC  --  7.8 9.3   HGB  --  11.6* 11.2*   MCV  --  86 84    322 372      Most Recent 3 BMP's:  Recent Labs   Lab Test 07/12/23  0549 07/11/23  0646 07/10/23  0919 07/09/23 0921 07/08/23  0540 07/07/23  0759   NA  --   --   --  133* 132* 131*   POTASSIUM 3.7 3.9 4.0 3.6 4.0 4.9   CHLORIDE  --   --   --  100 101 97*   CO2  --   --   --  22 22 21*   BUN  --   --   --  18.4 18.6 18.1   CR 0.58*  --  0.58* 0.58* 0.65*  0.61* 0.57*   ANIONGAP  --   --   --  11 9 13   DONNY  --   --   --  8.8 8.6* 8.4*   GLC  --   --   --  121* 88 96     Most Recent 2 LFT's:  Recent Labs   Lab Test 07/07/23  0759   AST 22   ALT 42   ALKPHOS 65   BILITOTAL 0.3     Most Recent INR's and Anticoagulation Dosing History:  Anticoagulation Dose History        Latest Ref Rng & Units 7/10/2023   Recent Dosing and Labs   INR 0.85 - 1.15 1.26      Most Recent 3 Troponin's:No lab results found.    Invalid input(s): TROP, TROPONINIES  Most Recent Cholesterol Panel:No lab results found.  Most Recent 6 Bacteria Isolates From Any Culture (See EPIC Reports for Culture Details):No lab results found.  Most Recent TSH, T4 and A1c Labs:No lab results found.    Results for orders placed or performed during the hospital encounter of 07/06/23   CT Bone Biopsy Deep    Narrative    PROCEDURES 7/10/2023:  1. CT guided right ischial  tuberosity bone biopsy.     Clinical History: Concern for osteomyelitis. Biopsy requested.    Comparisons: MRI 7/5/2023    Staff Radiologist: Marta Manning M.D., interventional radiology  staff. I, Marta Manning, performed the entire procedure.    Medications:   0.5% bupivacaine for local anesthesia, 5 mL    Nursing: The patient was placed on continuous monitoring. No sedation  administered. The patient remained stable throughout the procedure.    Dose: 438 mGy*cm    PROCEDURE: The patient understood the limitations, alternatives, and  risks of the procedure and requested the procedure be performed. Both  written and oral consent were obtained.    Patient placed left lateral decubitus on procedure table. Targeted CT  scan performed demonstrated adequate position of right ischium for  biopsy.    The right buttock was prepped and draped in the usual sterile fashion.  0.5% bupivacaine was used for local anesthesia.     Under CT guidance, a 14 gauge coaxial Bonopty introducer needle was  advanced into the right ischial tuberosity. A total of 4, 15-gauge  core biopsies were obtained with and submitted for culture and  surgical pathology as requested.    Needles removed. Hemostasis was spontaneous. Sterile dressing applied.    No immediate complication.       Impression    IMPRESSION:   CT-guided biopsy right initial tuberosity as detailed above.  Laboratory results are pending.    MARTA MANNING MD         SYSTEM ID:  [id number]

## 2023-07-12 NOTE — PLAN OF CARE
Goal Outcome Evaluation: MET      Plan of Care Reviewed With: patient, spouse    Overall Patient Progress: improvingOverall Patient Progress: improving     Focus: Discharge  D: Discharging to home with wife who is his care giver. All discharge information reviewed with his wife present. Rodriguez catheter removed at his request because they prefer to self cath at home. PIV removed as they are going to start his oral antibiotics at home. Reviewed dressing changes. A week supplies sent with pt. until they follow up with wound care clinic [place]. Follow up with primary MD reviewed. P: Discharge to home.

## 2023-07-13 ENCOUNTER — PATIENT OUTREACH (OUTPATIENT)
Dept: CARE COORDINATION | Facility: CLINIC | Age: 68
End: 2023-07-13
Payer: COMMERCIAL

## 2023-07-13 LAB
ATRIAL RATE - MUSE: 77 BPM
DIASTOLIC BLOOD PRESSURE - MUSE: NORMAL MMHG
INTERPRETATION ECG - MUSE: NORMAL
P AXIS - MUSE: 32 DEGREES
PATH REPORT.COMMENTS IMP SPEC: NORMAL
PATH REPORT.COMMENTS IMP SPEC: NORMAL
PATH REPORT.FINAL DX SPEC: NORMAL
PATH REPORT.GROSS SPEC: NORMAL
PATH REPORT.MICROSCOPIC SPEC OTHER STN: NORMAL
PATH REPORT.RELEVANT HX SPEC: NORMAL
PHOTO IMAGE: NORMAL
PR INTERVAL - MUSE: 168 MS
QRS DURATION - MUSE: 86 MS
QT - MUSE: 404 MS
QTC - MUSE: 457 MS
R AXIS - MUSE: -25 DEGREES
SYSTOLIC BLOOD PRESSURE - MUSE: NORMAL MMHG
T AXIS - MUSE: 8 DEGREES
VENTRICULAR RATE- MUSE: 77 BPM

## 2023-07-13 NOTE — PROGRESS NOTES
Clinic Care Coordination Contact  [facility]: Post-Discharge Note  SITUATION                                                      Admission:    Admission Date: 07/06/23   Reason for Admission: Sacral wound  Discharge:   Discharge Date: 07/12/23  Discharge Diagnosis: Subacute osteomyelitis, other site (H)    BACKGROUND                                                      Per hospital discharge summary and inpatient provider notes:  Steven Zavala is a 67 year old male who is seen at the bedside after transfer from [facility] in [place].  He is here for management of a sacral wound with osteomyelitis.  He does not have sensation to the region so he denies any pain to the area.  He denies any recent fevers, chills, known bleeding.  He has had regular stools.  He denies any new or significant symptoms including pain to other areas of the body.  He has chronic intermittent nausea.    ASSESSMENT           Discharge Assessment  How are you doing now that you are home?: great to be home  How are your symptoms? (Red Flag symptoms escalate to triage hotline per guidelines): Improved  Do you feel your condition is stable enough to be safe at home until your provider visit?: Yes  Does the patient have their discharge instructions? : Yes  Does the patient have questions regarding their discharge instructions? : No  Were you started on any new medications or were there changes to any of your previous medications? : Yes  Does the patient have all of their medications?: Yes  Do you have questions regarding any of your medications? : No  Do you have all of your needed medical supplies or equipment (DME)?  (i.e. oxygen tank, CPAP, cane, etc.): Yes  Discharge follow-up appointment scheduled within 14 calendar days? : Yes                  PLAN                                                      Outpatient Plan:  Follow up with primary care provider, Merline Manley, within 7 days for hospital follow- up. No follow up  labs or test are  needed.  Appointments on [place] and/or [facility] (with [facility] or [facility] provider or service). Call [phone] if you  haven't heard regarding these appointments within 7 days of discharge.    Future Appointments   Date Time Provider Department Center   8/3/2023 12:00 PM Yudy Patiño MD [facility]         For any urgent concerns, please contact our 24 hour nurse triage line: [phone] ([phone])         Kelli Aguilar MA

## 2023-07-15 LAB
BACTERIA BONE ANAEROBE+AEROBE CULT: NO GROWTH
GRAM STAIN RESULT: NORMAL
GRAM STAIN RESULT: NORMAL

## 2023-07-17 LAB — BACTERIA BONE ANAEROBE+AEROBE CULT: NORMAL

## 2023-08-02 NOTE — PROGRESS NOTES
[facility] Follow-Up Visit  8/3/2023   12:27-12:36         Assessment and Recommendations:   ASSESSMENT:  1. Stage 4 R IT ulceration with MRI suggesting underlying osteo. New ulcer for patient who has never had issues with them previously despite long-term quadriplegia.  - Wound culture at time of admission with Morganella, mixed dandre ([facility])  - Ortho recommended no urgent debridement  - Plastic surgery brief note recommended medical optimization and possible debridement and then formal consult later  - General surgery offered debridement vs. bone biopsy and trial of antibiotoics. Patient opted for latter option. Bone biopsy done 7/10 (after >72 hours broad spectrum abx). No growth to date. Pathology pending.  - at [facility] 7/5/23  2. L IT stage 2 ulceration, improving  3. 2020 K oxytoca UTI  (fluoroquinolone resistant, TMP-SMX sensitive). 2023 urine culture negative.   4. 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  As noted by Dr. Mcguire in her original ID consult note, recent studies have brought into question the utility of long-term antibiotics in the setting of chronic sacral osteomyelitis.  If there is not a clear plan for wound closure in patients with chronic osteomyelitis underlying sacral ulcers, 2 weeks of antibiotics are usually recommended to treat the localized infection.  For Mr. Zavala, I think our current goal should be resolution of the wound since this is a new issue for him.  Our ability to do this with antibiotics and local wound care depends somewhat on how much nonviable bone is present to the base of the wound which we do not currently know.  Most important will be to pin down wound care plan,  if/when debridement would still be recommended, and when to reconsult plastics if needed. In the meantime, we will tentatively plan for 6 week course antibiotics while monitoring wound healing and CRP for improvement.     For antibiotics, we will cover the Morganella as well as the Klebsiella recently in urine, but treatment will still be largely empiric. He does have coverage for home IV antibiotics, but I think we could also consider an oral regimen with good oral bioavailability (moxifloxacin + Bactrim would be one option). Bone cultures (done on antibiotics) remained negative. He has upcoming intake with [place] wound clinic.     RECOMMENDATION:  1. Continue levofloxacin 750 mg PO daily and metronidazole 500 mg PO Q8H to complete 6 weeks total antibiotics (through ~8/16/23) and the plan to stop unless new concerns identified from wound clinic.     Yudy Patiño MD  Infectious Diseases  Pager [phone]  ________________________________________________________________        Interval History:   Doing well. Happy to be home. Upcoming intake with wound clinic. No problems with levofloxacin and metronidazole at this point.          History of Present Illness:     This is a 67-year-old man with past history of traumatic quadriplegia since 1980.  He is transferred from [facility] in [place] for management of sacral decubitus wounds.    Steven reports that he does not have a chronic history of wounds.  His recent past history was remarkable for the appearance of lower extremity spasms and malaise and February 2023.  He was ultimately diagnosed with UTI.  Urine culture done locally revealed 10-50,000 colonies Klebsiella oxytoca.  He received ciprofloxacin, and possibly a subsequent anti-infective given the duration of his symptoms which in total approximately 3 weeks; after this he did return to his prior baseline. Of note, the spasms he had with this UTI required him to spend more time in his chair than he would  typically spend.    In late February 2023, he noticed leg swelling, was diagnosed with a DVT.  He saw oncology.  The conclusion was that this DVT in his lower extremity was provoked by UTI.  His oncologist reasoned that Steven's quadriplegia places him at elevated increased risk for future DVT and therefore recommended lifelong anticoagulation.  He is tolerating this well.    In the last several weeks, he has noted the appearance of bilateral wounds.  As above, he does not have a chronic history of wounds.  The left sacral wound began to show signs of healing, but the right wound has been progressive and in the recent past has produced increasing quantities of drainage.  He presented to [facility] on 7/5/23 where a wound culture was done that shows polymorphic organisms on Gram stain and on culture preliminarily shows Morganella morganii. MRI showed: Pelvic MRI WO/W Contrast 7/5/23 [facility]: deep medial right buttock soft tissue ulceration measuring 3.4 x 2.8 x 6.5 cm extending into the right ischial tuberosity with surrounding edema. No drainable abscess. Findings consistent with osteomyelitis involving the inferior right pubic ramus and ischial tuberosity. Reactive right inguinal lymph nodes.   He was placed on empiric pip/tazo and vanco and transferred to [facility] for multidisciplinary input. Upon transfer to [facility] he was placed on empiric PIP Tazo and Vanco.   Plastic surgery has indicated there is no role for reconstruction at this time. General surgery's evaluation is pending.    In talking with Steven this afternoon, he feels relatively well.  No fever.  No malaise.  No spasm suggestive of urinary infection.    7-Day Micro Results       ** No results found for the last 168 hours. **                  Allergies:   No Known Allergies         Physical Exam:   Virtual visit - no vitals.   Exam:  GENERAL: Healthy, alert and no distress. Quadriplegic.   EYES: Eyes grossly normal to inspection.  No discharge or  erythema, or obvious scleral/conjunctival abnormalities.  RESP: No audible wheeze, cough, or visible cyanosis.  No visible retractions or increased work of breathing.    SKIN: Visible skin clear. No significant rash, abnormal pigmentation or lesions.  NEURO: Cranial nerves grossly intact.  Mentation and speech appropriate for age.  PSYCH: Mentation appears normal, affect normal/bright, judgement and insight intact, normal speech and appearance well-groomed.          Laboratory Data:     Hematology Studies    Recent Labs   Lab Test 07/12/23  0549 07/09/23 0921 07/07/23 0759   WBC  --  7.8 9.3   HGB  --  11.6* 11.2*   MCV  --  86 84    322 372     Metabolic Studies     Recent Labs   Lab Test 07/12/23  0549 07/11/23  0646 07/10/23  0919 07/09/23  0921 07/08/23  0540 07/07/23 0759   NA  --   --   --  133* 132* 131*   POTASSIUM 3.7 3.9 4.0 3.6 4.0 4.9   CHLORIDE  --   --   --  100 101 97*   CO2  --   --   --  22 22 21*   BUN  --   --   --  18.4 18.6 18.1   CR 0.58*  --  0.58* 0.58* 0.65*  0.61* 0.57*   GFRESTIMATED >90  --  >90 >90 >90  >90 >90       Hepatic Studies    Recent Labs   Lab Test 07/07/23 0759   BILITOTAL 0.3   ALKPHOS 65   ALBUMIN 3.5   AST 22   ALT 42

## 2023-08-03 ENCOUNTER — VIRTUAL VISIT (OUTPATIENT)
Dept: INFECTIOUS DISEASES | Facility: CLINIC | Age: 68
End: 2023-08-03
Attending: INTERNAL MEDICINE
Payer: COMMERCIAL

## 2023-08-03 VITALS — WEIGHT: 220 LBS | HEIGHT: 72 IN | BODY MASS INDEX: 29.8 KG/M2

## 2023-08-03 DIAGNOSIS — M46.28 SACRAL OSTEOMYELITIS (H): Primary | ICD-10-CM

## 2023-08-03 PROCEDURE — 99214 OFFICE O/P EST MOD 30 MIN: CPT | Mod: VID | Performed by: INTERNAL MEDICINE

## 2023-08-03 ASSESSMENT — PAIN SCALES - GENERAL: PAINLEVEL: NO PAIN (0)

## 2023-08-03 NOTE — NURSING NOTE
Is the patient currently in the state of [place]? YES    Visit mode:VIDEO    If the visit is dropped, the patient can be reconnected by: VIDEO VISIT: Send to e-mail at: [email or web address]    Will anyone else be joining the visit? NO      How would you like to obtain your AVS? MyChart    Are changes needed to the allergy or medication list? NO    Reason for visit: RECHECK

## 2023-08-03 NOTE — LETTER
8/3/2023       RE: Steven Zavala  [address]  [place] [address]     Dear Colleague,    Thank you for referring your patient, Steven Zavala, to the [facility] INFECTIOUS DISEASE CLINIC [place] at [facility]. Please see a copy of my visit note below.      [facility] Follow-Up Visit  8/3/2023   12:27-12:36         Assessment and Recommendations:   ASSESSMENT:  Stage 4 R IT ulceration with MRI suggesting underlying osteo. New ulcer for patient who has never had issues with them previously despite long-term quadriplegia.  - Wound culture at time of admission with Morganella, mixed dandre ([facility])  - Ortho recommended no urgent debridement  - Plastic surgery brief note recommended medical optimization and possible debridement and then formal consult later  - General surgery offered debridement vs. bone biopsy and trial of antibiotoics. Patient opted for latter option. Bone biopsy done 7/10 (after >72 hours broad spectrum abx). No growth to date. Pathology pending.  - at [facility] 7/5/23  L IT stage 2 ulceration, improving  2020 K oxytoca UTI  (fluoroquinolone resistant, TMP-SMX sensitive). 2023 urine culture negative.   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  As noted by Dr. Mcguire in her original ID consult note, recent studies have brought into question the utility of long-term antibiotics in the setting of chronic sacral osteomyelitis.  If there is not a clear plan for wound closure in patients with chronic osteomyelitis underlying sacral ulcers, 2 weeks of antibiotics are usually recommended to treat the localized infection.  For Mr. Zavala, I think  our current goal should be resolution of the wound since this is a new issue for him.  Our ability to do this with antibiotics and local wound care depends somewhat on how much nonviable bone is present to the base of the wound which we do not currently know.  Most important will be to pin down wound care plan, if/when debridement would still be recommended, and when to reconsult plastics if needed. In the meantime, we will tentatively plan for 6 week course antibiotics while monitoring wound healing and CRP for improvement.     For antibiotics, we will cover the Morganella as well as the Klebsiella recently in urine, but treatment will still be largely empiric. He does have coverage for home IV antibiotics, but I think we could also consider an oral regimen with good oral bioavailability (moxifloxacin + Bactrim would be one option). Bone cultures (done on antibiotics) remained negative. He has upcoming intake with [place] wound clinic.     RECOMMENDATION:  1. Continue levofloxacin 750 mg PO daily and metronidazole 500 mg PO Q8H to complete 6 weeks total antibiotics (through ~8/16/23) and the plan to stop unless new concerns identified from wound clinic.     Yudy Patiño MD  Infectious Diseases  Pager [phone]  ________________________________________________________________        Interval History:   Doing well. Happy to be home. Upcoming intake with wound clinic. No problems with levofloxacin and metronidazole at this point.          History of Present Illness:     This is a 67-year-old man with past history of traumatic quadriplegia since 1980.  He is transferred from [facility] in [place] for management of sacral decubitus wounds.    Steven reports that he does not have a chronic history of wounds.  His recent past history was remarkable for the appearance of lower extremity spasms and malaise and February 2023.  He was ultimately diagnosed with UTI.  Urine culture done locally revealed 10-50,000 colonies  Klebsiella oxytoca.  He received ciprofloxacin, and possibly a subsequent anti-infective given the duration of his symptoms which in total approximately 3 weeks; after this he did return to his prior baseline. Of note, the spasms he had with this UTI required him to spend more time in his chair than he would typically spend.    In late February 2023, he noticed leg swelling, was diagnosed with a DVT.  He saw oncology.  The conclusion was that this DVT in his lower extremity was provoked by UTI.  His oncologist reasoned that Steven's quadriplegia places him at elevated increased risk for future DVT and therefore recommended lifelong anticoagulation.  He is tolerating this well.    In the last several weeks, he has noted the appearance of bilateral wounds.  As above, he does not have a chronic history of wounds.  The left sacral wound began to show signs of healing, but the right wound has been progressive and in the recent past has produced increasing quantities of drainage.  He presented to [facility] on 7/5/23 where a wound culture was done that shows polymorphic organisms on Gram stain and on culture preliminarily shows Morganella morganii. MRI showed: Pelvic MRI WO/W Contrast 7/5/23 [facility]: deep medial right buttock soft tissue ulceration measuring 3.4 x 2.8 x 6.5 cm extending into the right ischial tuberosity with surrounding edema. No drainable abscess. Findings consistent with osteomyelitis involving the inferior right pubic ramus and ischial tuberosity. Reactive right inguinal lymph nodes.   He was placed on empiric pip/tazo and vanco and transferred to [facility] for multidisciplinary input. Upon transfer to [facility] he was placed on empiric PIP Tazo and Vanco.   Plastic surgery has indicated there is no role for reconstruction at this time. General surgery's evaluation is pending.    In talking with Steven this afternoon, he feels relatively well.  No fever.  No malaise.  No spasm suggestive of urinary  infection.    7-Day Micro Results       ** No results found for the last 168 hours. **                  Allergies:   No Known Allergies         Physical Exam:   Virtual visit - no vitals.   Exam:  GENERAL: Healthy, alert and no distress. Quadriplegic.   EYES: Eyes grossly normal to inspection.  No discharge or erythema, or obvious scleral/conjunctival abnormalities.  RESP: No audible wheeze, cough, or visible cyanosis.  No visible retractions or increased work of breathing.    SKIN: Visible skin clear. No significant rash, abnormal pigmentation or lesions.  NEURO: Cranial nerves grossly intact.  Mentation and speech appropriate for age.  PSYCH: Mentation appears normal, affect normal/bright, judgement and insight intact, normal speech and appearance well-groomed.          Laboratory Data:     Hematology Studies    Recent Labs   Lab Test 07/12/23  0549 07/09/23 0921 07/07/23  0759   WBC  --  7.8 9.3   HGB  --  11.6* 11.2*   MCV  --  86 84    322 372     Metabolic Studies     Recent Labs   Lab Test 07/12/23  0549 07/11/23  0646 07/10/23  0919 07/09/23  0921 07/08/23  0540 07/07/23  0759   NA  --   --   --  133* 132* 131*   POTASSIUM 3.7 3.9 4.0 3.6 4.0 4.9   CHLORIDE  --   --   --  100 101 97*   CO2  --   --   --  22 22 21*   BUN  --   --   --  18.4 18.6 18.1   CR 0.58*  --  0.58* 0.58* 0.65*  0.61* 0.57*   GFRESTIMATED >90  --  >90 >90 >90  >90 >90       Hepatic Studies    Recent Labs   Lab Test 07/07/23  0759   BILITOTAL 0.3   ALKPHOS 65   ALBUMIN 3.5   AST 22   ALT 42         Virtual Visit Details    Type of service:  Video Visit   Video Start Time: 12:27  Video End Time:12:36    Originating Location (pt. Location): Home  Distant Location (provider location):  On-site  Platform used for Video Visit: [employer]

## 2023-08-03 NOTE — PROGRESS NOTES
Virtual Visit Details    Type of service:  Video Visit   Video Start Time: 12:27  Video End Time:12:36    Originating Location (pt. Location): Home  Distant Location (provider location):  On-site  Platform used for Video Visit: Alan

## 2023-08-07 NOTE — PATIENT INSTRUCTIONS
Continue levofloxacin and metronidazole through 8/16/23 and then plan to stop unless new concerns identified from wound clinic.

## 2023-08-13 ENCOUNTER — HEALTH MAINTENANCE LETTER (OUTPATIENT)
Age: 68
End: 2023-08-13

## 2023-11-28 ENCOUNTER — HOSPITAL ENCOUNTER (OUTPATIENT)
Age: 68
End: 2023-11-28
Payer: COMMERCIAL

## 2024-10-06 ENCOUNTER — HEALTH MAINTENANCE LETTER (OUTPATIENT)
Age: 69
End: 2024-10-06

## 2025-01-31 ENCOUNTER — TRANSFERRED RECORDS (OUTPATIENT)
Dept: HEALTH INFORMATION MANAGEMENT | Facility: CLINIC | Age: 70
End: 2025-01-31
Payer: COMMERCIAL

## 2025-02-12 ENCOUNTER — MEDICAL CORRESPONDENCE (OUTPATIENT)
Dept: HEALTH INFORMATION MANAGEMENT | Facility: CLINIC | Age: 70
End: 2025-02-12
Payer: COMMERCIAL

## 2025-02-14 NOTE — TELEPHONE ENCOUNTER
Action Taken 02/14/2025 1:02 PM  [id number]    Request for CT image done at [facility].   **image resolved.     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 Radiology  1/24/25 CT neck, CT head CE Req 2/14  PACS   [facility] Consult 1/31/25 Case: CR-  Left palate, Basal cell neoplasm Send to scan

## 2025-02-19 ENCOUNTER — PRE VISIT (OUTPATIENT)
Dept: OTOLARYNGOLOGY | Facility: CLINIC | Age: 70
End: 2025-02-19

## 2025-02-19 ENCOUNTER — VIRTUAL VISIT (OUTPATIENT)
Dept: OTOLARYNGOLOGY | Facility: CLINIC | Age: 70
End: 2025-02-19
Payer: COMMERCIAL

## 2025-02-19 ENCOUNTER — PREP FOR PROCEDURE (OUTPATIENT)
Dept: OTOLARYNGOLOGY | Facility: CLINIC | Age: 70
End: 2025-02-19

## 2025-02-19 DIAGNOSIS — R22.1 PARAPHARYNGEAL SPACE MASS: Primary | ICD-10-CM

## 2025-02-19 NOTE — NURSING NOTE
CT order faxed to [facility] radiology and requested they call patient to schedule scan prior to surgery.     Fax:[phone]      Che Atkinson RN, BSN

## 2025-02-19 NOTE — PATIENT INSTRUCTIONS
1. We will call you to arrange date/time for surgery  2. Please schedule a pre-op physical with their primary MD within 30 days of the procedure.   3. Please avoid blood thinning medications 1 week prior to surgery (Ibuprofen, Aleve, Aspirin, etc.)   4. Please review contents within the surgical packet & use the antiseptic scrub as directed.   5. Please call [facility] with any further questions [phone]

## 2025-02-19 NOTE — LETTER
2/19/2025       RE: Steven Zavala  [address]  [place] [address]     Dear Colleague,    Thank you for referring your patient, Steven Zavala, to the [facility] EAR NOSE AND THROAT CLINIC [place] at [facility]. Please see a copy of my visit note below.    History of present illness: I was asked to see Mr. Steven Zavala by Dr. Dudley regarding a left parapharyngeal space mass.  The patient is a 69-year-old gentleman who recently was seen by his primary care doctor who noticed some swelling in the left oropharynx and he was sent for imaging followed by visit with Dr. Dudley.  The patient tells me that Dr. Dudley placed a needle and francy out a lot of fluid, he is unsure if it has reaccumulated in the interim.  The pathology report has not yet been reviewed here but was read as a basal cell neoplasm at the [facility].  However he denies any odynophagia, dysphagia or hoarseness.  He has not noticed any lumps or bumps in his neck.  He has not noticed any challenges in opening his jaw.    He is wheelchair-bound due to a neck injury approximately 40 years ago and does have pressure sores that need to be debrided periodically and he is wondering when he should do that next.  He did have a tonsillectomy as a child.        No past medical history on file.  No past surgical history on file.    Current Outpatient Medications:      apixaban ANTICOAGULANT (ELIQUIS) 5 MG tablet, Take 5 mg by mouth 2 times daily, Disp: , Rfl:      ibuprofen (ADVIL/MOTRIN) 200 MG tablet, Take 400 mg by mouth every 6 hours as needed for pain, Disp: , Rfl:      ondansetron (ZOFRAN) 8 MG tablet, Take 8 mg by mouth every 8 hours as needed for nausea (Patient not taking: Reported on 8/3/2023), Disp: , Rfl:      UNABLE TO FIND, Take 2 tablets by mouth daily MEDICATION NAME: Uqora (Urinary Health - Reduces UTI risk) (Patient not taking: Reported on 2/19/2025), Disp: , Rfl:      UNABLE TO  FIND, Take 1 packet by mouth 3 times daily MEDICATION NAME: Power Mannose Powder (Patient not taking: Reported on 2/19/2025), Disp: , Rfl:      UNABLE TO FIND, Take 1 Dose by mouth 2 times daily MEDICATION NAME: Oliver Powder (Therapeutic Nutrition Powder) (Patient not taking: Reported on 2/19/2025), Disp: , Rfl:   No Known Allergies  Social History     Tobacco Use     Smoking status: Not on file     Smokeless tobacco: Not on file   Substance Use Topics     Alcohol use: Not on file     Review Of Systems  Skin: negative  Eyes: negative  Ears/Nose/Throat: negative  Respiratory: No shortness of breath, dyspnea on exertion, cough, or hemoptysis  Cardiovascular: negative  Gastrointestinal: negative  Genitourinary: negative  Musculoskeletal: negative  Neurologic: negative  Psychiatric: negative  Hematologic/Lymphatic/Immunologic: negative  Endocrine: negative    Limited examination was done today during today's video visit.  I did examine his jaw opening which looks normal.  His tongue movement is also normal.  I do not see any obvious swelling in his neck.    Imaging: The patient has a CT scan from January 24, 2025 which shows a well encapsulated cystic lesion in the left parapharyngeal space that is resting on top of the submandibular gland on the left side.  It does get close to the mucosa of the left tonsillar fossa.    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  I counseled the patient I would like to repeat his CT scan to see if the fluid has reaccumulated after the drainage  2.  I discussed with him that we could attempt a Tranzarel versus transcervical excision of the left parapharyngeal space for diagnosis and management.  Given that I cannot do physical exam today because of the virtual visit I told him that I might have to change the plan or determine the plan from the above options on the day of surgery.  I discussed with him the risks of surgery which include,  but are not limited to bleeding, infection, return trip to the operating room possible cranial nerve injury including the marginal mandibular nerve.  He also understands that if we do this transorally, he might need a temporary feeding tube while the pharyngeal mucosa heals.  All of his questions were answered and he would like to have surgery soon but I encouraged him to have his pressure sores dealt with first.      Again, thank you for allowing me to participate in the care of your patient.      Sincerely,    Jermaine Storm MD

## 2025-02-19 NOTE — PROGRESS NOTES
History of present illness: I was asked to see Mr. Steven Zavala by Dr. Dudley regarding a left parapharyngeal space mass.  The patient is a 69-year-old gentleman who recently was seen by his primary care doctor who noticed some swelling in the left oropharynx and he was sent for imaging followed by visit with Dr. Dudley.  The patient tells me that Dr. Dudley placed a needle and francy out a lot of fluid, he is unsure if it has reaccumulated in the interim.  The pathology report has not yet been reviewed here but was read as a basal cell neoplasm at the [facility].  However he denies any odynophagia, dysphagia or hoarseness.  He has not noticed any lumps or bumps in his neck.  He has not noticed any challenges in opening his jaw.    He is wheelchair-bound due to a neck injury approximately 40 years ago and does have pressure sores that need to be debrided periodically and he is wondering when he should do that next.  He did have a tonsillectomy as a child.        No past medical history on file.  No past surgical history on file.    Current Outpatient Medications:     apixaban ANTICOAGULANT (ELIQUIS) 5 MG tablet, Take 5 mg by mouth 2 times daily, Disp: , Rfl:     ibuprofen (ADVIL/MOTRIN) 200 MG tablet, Take 400 mg by mouth every 6 hours as needed for pain, Disp: , Rfl:     ondansetron (ZOFRAN) 8 MG tablet, Take 8 mg by mouth every 8 hours as needed for nausea (Patient not taking: Reported on 8/3/2023), Disp: , Rfl:     UNABLE TO FIND, Take 2 tablets by mouth daily MEDICATION NAME: Uqora (Urinary Health - Reduces UTI risk) (Patient not taking: Reported on 2/19/2025), Disp: , Rfl:     UNABLE TO FIND, Take 1 packet by mouth 3 times daily MEDICATION NAME: Power Mannose Powder (Patient not taking: Reported on 2/19/2025), Disp: , Rfl:     UNABLE TO FIND, Take 1 Dose by mouth 2 times daily MEDICATION NAME: Oliver Powder (Therapeutic Nutrition Powder) (Patient not taking: Reported on 2/19/2025), Disp: , Rfl:   No Known  Allergies  Social History     Tobacco Use    Smoking status: Not on file    Smokeless tobacco: Not on file   Substance Use Topics    Alcohol use: Not on file     Review Of Systems  Skin: negative  Eyes: negative  Ears/Nose/Throat: negative  Respiratory: No shortness of breath, dyspnea on exertion, cough, or hemoptysis  Cardiovascular: negative  Gastrointestinal: negative  Genitourinary: negative  Musculoskeletal: negative  Neurologic: negative  Psychiatric: negative  Hematologic/Lymphatic/Immunologic: negative  Endocrine: negative    Limited examination was done today during today's video visit.  I did examine his jaw opening which looks normal.  His tongue movement is also normal.  I do not see any obvious swelling in his neck.    Imaging: The patient has a CT scan from January 24, 2025 which shows a well encapsulated cystic lesion in the left parapharyngeal space that is resting on top of the submandibular gland on the left side.  It does get close to the mucosa of the left tonsillar fossa.    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  I counseled the patient I would like to repeat his CT scan to see if the fluid has reaccumulated after the drainage  2.  I discussed with him that we could attempt a Tranzarel versus transcervical excision of the left parapharyngeal space for diagnosis and management.  Given that I cannot do physical exam today because of the virtual visit I told him that I might have to change the plan or determine the plan from the above options on the day of surgery.  I discussed with him the risks of surgery which include, but are not limited to bleeding, infection, return trip to the operating room possible cranial nerve injury including the marginal mandibular nerve.  He also understands that if we do this transorally, he might need a temporary feeding tube while the pharyngeal mucosa heals.  All of his questions were answered and he would  like to have surgery soon but I encouraged him to have his pressure sores dealt with first.

## 2025-02-19 NOTE — NURSING NOTE
Teaching Flowsheet - ENT   Relevant Diagnosis: parapharyngeal space mass  Teaching Topic:trans oral versus transcervical excision of the left parapharyngeal space   Person(s) involved in teaching: Patient        Motivation Level: high  Asks Questions:   Yes  Eager to Learn:   Yes  Cooperative:   Yes  Receptive (willing/able to accept information):   Yes  Comments: Reviewed pre-op H and P,  NPO prior to  surgery,  pre-op scrub (given Hibiclens)  Reviewed post-op  cares , activity and pain.     Patient demonstrates understanding of the following:  Reason for the appointment, diagnosis and treatment plan:   Yes  Knowledge of proper use of medications and conditions for which they are ordered (with special attention to potential side effects or drug interactions):  stop aspirin products 1 week before surgery Yes  Which situations necessitate calling provider and whom to contact:   Yes  Nutritional needs and diet plan:   Yes  Pain management techniques:   Yes  Patient instructed on hand hygiene:  Yes  How and/when to access community resources:   Yes     Infection Prevention:  Patient   demonstrates understanding of the following:  Surgical procedure site care taught Yes  Signs and symptoms of infection taught Yes  Wound care taught Yes  Instructional Materials Used/Given: pre- op booklet,verbal  Instruction.      Che Atkinson, RN, BSN

## 2025-02-24 ENCOUNTER — TELEPHONE (OUTPATIENT)
Dept: OTOLARYNGOLOGY | Facility: CLINIC | Age: 70
End: 2025-02-24
Payer: COMMERCIAL

## 2025-02-24 NOTE — TELEPHONE ENCOUNTER
Left patient a voicemail to schedule surgery for Transoral versus transcervical excision of the left parapharyngeal space with Dr. Storm - Left Surgery Scheduling line for callback [phone]    Sangeeta See on 2/24/2025 at 3:17 PM

## 2025-02-24 NOTE — TELEPHONE ENCOUNTER
Scheduled surgery with Dr. Storm on 3/10/2025    Spoke with: Patient    Surgery is located at [facility]    Patient confirmed they have already been seen for their H&P by Dr. Rios on 2/24/2025    Does patient need a consult before upcoming surgery? No    Anesthesia type: General    Requested Imaging required for surgery: Yes: CT locally prior to surgery - patient plans on calling tomorrow to schedule locally    Patient is scheduled for their 2 week post op on 3/26/2025 at 445pm with Dr. Storm    Patient will receive their surgery packet via [employer] per their preference    Patient was not provided a start time for surgery & is aware they will receive this information 2-3 days before surgery    Additional comments: patient having surgery to remove pressure sores on 3/4. Said the surgeon doing it said he was okay to do this surgery but wanted to confirm it was okay with Dr. Storm as well     Patient was instructed to call back with any further questions or concerns.     Sangeeta See on 2/24/2025 at 4:12 PM

## 2025-03-07 ENCOUNTER — ANESTHESIA EVENT (OUTPATIENT)
Dept: SURGERY | Facility: CLINIC | Age: 70
End: 2025-03-07
Payer: COMMERCIAL

## 2025-03-10 ENCOUNTER — ANESTHESIA (OUTPATIENT)
Dept: SURGERY | Facility: CLINIC | Age: 70
End: 2025-03-10
Payer: COMMERCIAL

## 2025-03-10 ENCOUNTER — HOSPITAL ENCOUNTER (OUTPATIENT)
Facility: CLINIC | Age: 70
Discharge: HOME | End: 2025-03-10
Attending: OTOLARYNGOLOGY | Admitting: OTOLARYNGOLOGY
Payer: COMMERCIAL

## 2025-03-10 VITALS
HEART RATE: 94 BPM | DIASTOLIC BLOOD PRESSURE: 69 MMHG | OXYGEN SATURATION: 100 % | BODY MASS INDEX: 25.73 KG/M2 | WEIGHT: 190 LBS | HEIGHT: 72 IN | TEMPERATURE: 97.6 F | RESPIRATION RATE: 18 BRPM | SYSTOLIC BLOOD PRESSURE: 91 MMHG

## 2025-03-10 DIAGNOSIS — R22.1 MASS OF PARAPHARYNGEAL SPACE: Primary | ICD-10-CM

## 2025-03-10 LAB
GLUCOSE BLDC GLUCOMTR-MCNC: 90 MG/DL (ref 70–99)
PATH REPORT.COMMENTS IMP SPEC: NORMAL
PATH REPORT.INTRAOP OBS SPEC DOC: NORMAL

## 2025-03-10 PROCEDURE — 250N000009 HC RX 250

## 2025-03-10 PROCEDURE — 999N000141 HC STATISTIC PRE-PROCEDURE NURSING ASSESSMENT: Performed by: OTOLARYNGOLOGY

## 2025-03-10 PROCEDURE — 258N000003 HC RX IP 258 OP 636

## 2025-03-10 PROCEDURE — 88311 DECALCIFY TISSUE: CPT | Mod: 26 | Performed by: STUDENT IN AN ORGANIZED HEALTH CARE EDUCATION/TRAINING PROGRAM

## 2025-03-10 PROCEDURE — 710N000012 HC RECOVERY PHASE 2, PER MINUTE: Performed by: OTOLARYNGOLOGY

## 2025-03-10 PROCEDURE — 88311 DECALCIFY TISSUE: CPT | Mod: TC | Performed by: OTOLARYNGOLOGY

## 2025-03-10 PROCEDURE — 250N000013 HC RX MED GY IP 250 OP 250 PS 637

## 2025-03-10 PROCEDURE — 250N000025 HC SEVOFLURANE, PER MIN: Performed by: OTOLARYNGOLOGY

## 2025-03-10 PROCEDURE — 272N000001 HC OR GENERAL SUPPLY STERILE: Performed by: OTOLARYNGOLOGY

## 2025-03-10 PROCEDURE — 88307 TISSUE EXAM BY PATHOLOGIST: CPT | Mod: 26 | Performed by: STUDENT IN AN ORGANIZED HEALTH CARE EDUCATION/TRAINING PROGRAM

## 2025-03-10 PROCEDURE — 88331 PATH CONSLTJ SURG 1 BLK 1SPC: CPT | Mod: 26 | Performed by: STUDENT IN AN ORGANIZED HEALTH CARE EDUCATION/TRAINING PROGRAM

## 2025-03-10 PROCEDURE — 250N000011 HC RX IP 250 OP 636: Performed by: OTOLARYNGOLOGY

## 2025-03-10 PROCEDURE — 21556 EXC NECK TUM DEEP < 5 CM: CPT | Mod: GC | Performed by: OTOLARYNGOLOGY

## 2025-03-10 PROCEDURE — 88360 TUMOR IMMUNOHISTOCHEM/MANUAL: CPT | Mod: 26 | Performed by: STUDENT IN AN ORGANIZED HEALTH CARE EDUCATION/TRAINING PROGRAM

## 2025-03-10 PROCEDURE — 88341 IMHCHEM/IMCYTCHM EA ADD ANTB: CPT | Mod: 26 | Performed by: STUDENT IN AN ORGANIZED HEALTH CARE EDUCATION/TRAINING PROGRAM

## 2025-03-10 PROCEDURE — 250N000011 HC RX IP 250 OP 636

## 2025-03-10 PROCEDURE — 370N000017 HC ANESTHESIA TECHNICAL FEE, PER MIN: Performed by: OTOLARYNGOLOGY

## 2025-03-10 PROCEDURE — 710N000009 HC RECOVERY PHASE 1, LEVEL 1, PER MIN: Performed by: OTOLARYNGOLOGY

## 2025-03-10 PROCEDURE — 88305 TISSUE EXAM BY PATHOLOGIST: CPT | Mod: 26 | Performed by: STUDENT IN AN ORGANIZED HEALTH CARE EDUCATION/TRAINING PROGRAM

## 2025-03-10 PROCEDURE — 88342 IMHCHEM/IMCYTCHM 1ST ANTB: CPT | Mod: 26 | Performed by: STUDENT IN AN ORGANIZED HEALTH CARE EDUCATION/TRAINING PROGRAM

## 2025-03-10 PROCEDURE — 88331 PATH CONSLTJ SURG 1 BLK 1SPC: CPT | Mod: TC | Performed by: OTOLARYNGOLOGY

## 2025-03-10 PROCEDURE — 258N000003 HC RX IP 258 OP 636: Performed by: OTOLARYNGOLOGY

## 2025-03-10 PROCEDURE — 360N000076 HC SURGERY LEVEL 3, PER MIN: Performed by: OTOLARYNGOLOGY

## 2025-03-10 RX ORDER — CEFAZOLIN SODIUM/WATER 2 G/20 ML
2 SYRINGE (ML) INTRAVENOUS
Status: COMPLETED | OUTPATIENT
Start: 2025-03-10 | End: 2025-03-10

## 2025-03-10 RX ORDER — PROPOFOL 10 MG/ML
INJECTION, EMULSION INTRAVENOUS PRN
Status: DISCONTINUED | OUTPATIENT
Start: 2025-03-10 | End: 2025-03-10

## 2025-03-10 RX ORDER — ONDANSETRON 4 MG/1
4 TABLET, ORALLY DISINTEGRATING ORAL EVERY 30 MIN PRN
Status: DISCONTINUED | OUTPATIENT
Start: 2025-03-10 | End: 2025-03-10 | Stop reason: HOSPADM

## 2025-03-10 RX ORDER — ONDANSETRON 2 MG/ML
4 INJECTION INTRAMUSCULAR; INTRAVENOUS EVERY 30 MIN PRN
Status: DISCONTINUED | OUTPATIENT
Start: 2025-03-10 | End: 2025-03-10 | Stop reason: HOSPADM

## 2025-03-10 RX ORDER — OXYCODONE HYDROCHLORIDE 5 MG/1
5 TABLET ORAL
Status: COMPLETED | OUTPATIENT
Start: 2025-03-10 | End: 2025-03-10

## 2025-03-10 RX ORDER — HYDROMORPHONE HCL IN WATER/PF 6 MG/30 ML
0.2 PATIENT CONTROLLED ANALGESIA SYRINGE INTRAVENOUS EVERY 5 MIN PRN
Status: DISCONTINUED | OUTPATIENT
Start: 2025-03-10 | End: 2025-03-10 | Stop reason: HOSPADM

## 2025-03-10 RX ORDER — OXYCODONE HYDROCHLORIDE 10 MG/1
10 TABLET ORAL
Status: DISCONTINUED | OUTPATIENT
Start: 2025-03-10 | End: 2025-03-10 | Stop reason: HOSPADM

## 2025-03-10 RX ORDER — ACETAMINOPHEN 325 MG/1
650 TABLET ORAL ONCE
Status: COMPLETED | OUTPATIENT
Start: 2025-03-10 | End: 2025-03-10

## 2025-03-10 RX ORDER — SODIUM CHLORIDE, SODIUM LACTATE, POTASSIUM CHLORIDE, CALCIUM CHLORIDE 600; 310; 30; 20 MG/100ML; MG/100ML; MG/100ML; MG/100ML
INJECTION, SOLUTION INTRAVENOUS CONTINUOUS PRN
Status: DISCONTINUED | OUTPATIENT
Start: 2025-03-10 | End: 2025-03-10

## 2025-03-10 RX ORDER — FENTANYL CITRATE 50 UG/ML
INJECTION, SOLUTION INTRAMUSCULAR; INTRAVENOUS PRN
Status: DISCONTINUED | OUTPATIENT
Start: 2025-03-10 | End: 2025-03-10

## 2025-03-10 RX ORDER — NALOXONE HYDROCHLORIDE 0.4 MG/ML
0.1 INJECTION, SOLUTION INTRAMUSCULAR; INTRAVENOUS; SUBCUTANEOUS
Status: DISCONTINUED | OUTPATIENT
Start: 2025-03-10 | End: 2025-03-10 | Stop reason: HOSPADM

## 2025-03-10 RX ORDER — FENTANYL CITRATE 50 UG/ML
25 INJECTION, SOLUTION INTRAMUSCULAR; INTRAVENOUS EVERY 5 MIN PRN
Status: DISCONTINUED | OUTPATIENT
Start: 2025-03-10 | End: 2025-03-10 | Stop reason: HOSPADM

## 2025-03-10 RX ORDER — GLYCOPYRROLATE 0.2 MG/ML
INJECTION, SOLUTION INTRAMUSCULAR; INTRAVENOUS PRN
Status: DISCONTINUED | OUTPATIENT
Start: 2025-03-10 | End: 2025-03-10

## 2025-03-10 RX ORDER — DEXAMETHASONE SODIUM PHOSPHATE 4 MG/ML
4 INJECTION, SOLUTION INTRA-ARTICULAR; INTRALESIONAL; INTRAMUSCULAR; INTRAVENOUS; SOFT TISSUE
Status: DISCONTINUED | OUTPATIENT
Start: 2025-03-10 | End: 2025-03-10 | Stop reason: HOSPADM

## 2025-03-10 RX ORDER — HYDROMORPHONE HCL IN WATER/PF 6 MG/30 ML
0.4 PATIENT CONTROLLED ANALGESIA SYRINGE INTRAVENOUS EVERY 5 MIN PRN
Status: DISCONTINUED | OUTPATIENT
Start: 2025-03-10 | End: 2025-03-10 | Stop reason: HOSPADM

## 2025-03-10 RX ORDER — DEXAMETHASONE SODIUM PHOSPHATE 4 MG/ML
10 INJECTION, SOLUTION INTRA-ARTICULAR; INTRALESIONAL; INTRAMUSCULAR; INTRAVENOUS; SOFT TISSUE ONCE
Status: DISCONTINUED | OUTPATIENT
Start: 2025-03-10 | End: 2025-03-10 | Stop reason: HOSPADM

## 2025-03-10 RX ORDER — DEXAMETHASONE SODIUM PHOSPHATE 4 MG/ML
INJECTION, SOLUTION INTRA-ARTICULAR; INTRALESIONAL; INTRAMUSCULAR; INTRAVENOUS; SOFT TISSUE PRN
Status: DISCONTINUED | OUTPATIENT
Start: 2025-03-10 | End: 2025-03-10

## 2025-03-10 RX ORDER — FENTANYL CITRATE 50 UG/ML
50 INJECTION, SOLUTION INTRAMUSCULAR; INTRAVENOUS EVERY 5 MIN PRN
Status: DISCONTINUED | OUTPATIENT
Start: 2025-03-10 | End: 2025-03-10 | Stop reason: HOSPADM

## 2025-03-10 RX ORDER — SODIUM CHLORIDE, SODIUM LACTATE, POTASSIUM CHLORIDE, CALCIUM CHLORIDE 600; 310; 30; 20 MG/100ML; MG/100ML; MG/100ML; MG/100ML
INJECTION, SOLUTION INTRAVENOUS CONTINUOUS
Status: DISCONTINUED | OUTPATIENT
Start: 2025-03-10 | End: 2025-03-10 | Stop reason: HOSPADM

## 2025-03-10 RX ORDER — CEFAZOLIN SODIUM/WATER 2 G/20 ML
2 SYRINGE (ML) INTRAVENOUS SEE ADMIN INSTRUCTIONS
Status: DISCONTINUED | OUTPATIENT
Start: 2025-03-10 | End: 2025-03-10 | Stop reason: HOSPADM

## 2025-03-10 RX ORDER — LIDOCAINE HYDROCHLORIDE 20 MG/ML
INJECTION, SOLUTION INFILTRATION; PERINEURAL PRN
Status: DISCONTINUED | OUTPATIENT
Start: 2025-03-10 | End: 2025-03-10

## 2025-03-10 RX ORDER — ACETAMINOPHEN 325 MG/1
650 TABLET ORAL EVERY 4 HOURS PRN
Qty: 50 TABLET | Refills: 0 | Status: SHIPPED | OUTPATIENT
Start: 2025-03-10

## 2025-03-10 RX ORDER — ONDANSETRON 2 MG/ML
INJECTION INTRAMUSCULAR; INTRAVENOUS PRN
Status: DISCONTINUED | OUTPATIENT
Start: 2025-03-10 | End: 2025-03-10

## 2025-03-10 RX ORDER — HYDRALAZINE HYDROCHLORIDE 20 MG/ML
2.5-5 INJECTION INTRAMUSCULAR; INTRAVENOUS EVERY 10 MIN PRN
Status: DISCONTINUED | OUTPATIENT
Start: 2025-03-10 | End: 2025-03-10 | Stop reason: HOSPADM

## 2025-03-10 RX ORDER — LABETALOL HYDROCHLORIDE 5 MG/ML
10 INJECTION, SOLUTION INTRAVENOUS
Status: DISCONTINUED | OUTPATIENT
Start: 2025-03-10 | End: 2025-03-10 | Stop reason: HOSPADM

## 2025-03-10 RX ORDER — OXYCODONE HYDROCHLORIDE 5 MG/1
5-10 TABLET ORAL EVERY 4 HOURS PRN
Qty: 6 TABLET | Refills: 0 | Status: SHIPPED | OUTPATIENT
Start: 2025-03-10

## 2025-03-10 RX ADMIN — PHENYLEPHRINE HYDROCHLORIDE 100 MCG: 10 INJECTION INTRAVENOUS at 08:07

## 2025-03-10 RX ADMIN — FENTANYL CITRATE 25 MCG: 50 INJECTION INTRAMUSCULAR; INTRAVENOUS at 11:05

## 2025-03-10 RX ADMIN — PHENYLEPHRINE HYDROCHLORIDE 100 MCG: 10 INJECTION INTRAVENOUS at 08:31

## 2025-03-10 RX ADMIN — PHENYLEPHRINE HYDROCHLORIDE 0.5 MCG/KG/MIN: 10 INJECTION INTRAVENOUS at 08:16

## 2025-03-10 RX ADMIN — ONDANSETRON 4 MG: 2 INJECTION INTRAMUSCULAR; INTRAVENOUS at 09:38

## 2025-03-10 RX ADMIN — OXYCODONE HYDROCHLORIDE 5 MG: 5 TABLET ORAL at 14:30

## 2025-03-10 RX ADMIN — PROPOFOL 100 MG: 10 INJECTION, EMULSION INTRAVENOUS at 08:04

## 2025-03-10 RX ADMIN — DEXAMETHASONE SODIUM PHOSPHATE 10 MG: 4 INJECTION, SOLUTION INTRA-ARTICULAR; INTRALESIONAL; INTRAMUSCULAR; INTRAVENOUS; SOFT TISSUE at 08:05

## 2025-03-10 RX ADMIN — FENTANYL CITRATE 25 MCG: 50 INJECTION INTRAMUSCULAR; INTRAVENOUS at 09:59

## 2025-03-10 RX ADMIN — GLYCOPYRROLATE 0.2 MG: 0.2 INJECTION, SOLUTION INTRAMUSCULAR; INTRAVENOUS at 09:23

## 2025-03-10 RX ADMIN — PHENYLEPHRINE HYDROCHLORIDE 100 MCG: 10 INJECTION INTRAVENOUS at 08:15

## 2025-03-10 RX ADMIN — REMIFENTANIL HYDROCHLORIDE 0.05 MCG/KG/MIN: 1 INJECTION, POWDER, LYOPHILIZED, FOR SOLUTION INTRAVENOUS at 08:27

## 2025-03-10 RX ADMIN — PHENYLEPHRINE HYDROCHLORIDE 100 MCG: 10 INJECTION INTRAVENOUS at 08:21

## 2025-03-10 RX ADMIN — PROPOFOL 20 MG: 10 INJECTION, EMULSION INTRAVENOUS at 11:04

## 2025-03-10 RX ADMIN — Medication 2 G: at 08:14

## 2025-03-10 RX ADMIN — LIDOCAINE HYDROCHLORIDE 100 MG: 20 INJECTION, SOLUTION INFILTRATION; PERINEURAL at 08:04

## 2025-03-10 RX ADMIN — FENTANYL CITRATE 100 MCG: 50 INJECTION INTRAMUSCULAR; INTRAVENOUS at 08:04

## 2025-03-10 RX ADMIN — Medication 50 MG: at 08:04

## 2025-03-10 RX ADMIN — SODIUM CHLORIDE, POTASSIUM CHLORIDE, SODIUM LACTATE AND CALCIUM CHLORIDE: 600; 310; 30; 20 INJECTION, SOLUTION INTRAVENOUS at 08:03

## 2025-03-10 RX ADMIN — ACETAMINOPHEN 650 MG: 325 TABLET, FILM COATED ORAL at 14:30

## 2025-03-10 ASSESSMENT — ACTIVITIES OF DAILY LIVING (ADL)
ADLS_ACUITY_SCORE: 45
ADLS_ACUITY_SCORE: 47
ADLS_ACUITY_SCORE: 47
ADLS_ACUITY_SCORE: 45
ADLS_ACUITY_SCORE: 47
ADLS_ACUITY_SCORE: 45
ADLS_ACUITY_SCORE: 47

## 2025-03-10 NOTE — OP NOTE
March 10, 2025      Pre-op Diagnosis:   Parapharyngeal space mass   Post-op Diagnosis:   Same  Procedure:   Transcervical excision of left parapharyngeal space mass    Surgeons:  Shannon Ramirez MD;  Jermaine Storm MD    Anesthesia:  GETA  EBL:  30 cc  Drains:  7 fr ELIDIA drain      Complications:   None   Specimens:     ID Type Source Tests Collected by Time Destination   1 : Left Parapharyngeal Space mass Tissue Other SURGICAL PATHOLOGY EXAM Jermaine Storm MD 3/10/2025  9:29 AM    2 : Styloid Process Tissue Other SURGICAL PATHOLOGY EXAM Jermaine Storm MD 3/10/2025 10:07 AM    3 : Deep Lobe Parotid Tissue Parotid Gland, Left SURGICAL PATHOLOGY EXAM Jermaine Storm MD 3/10/2025 10:18 AM    4 : Posterior Margin Tissue Other SURGICAL PATHOLOGY EXAM Jermaine Storm MD 3/10/2025 10:12 AM    5 : pterygoid margin Tissue Other SURGICAL PATHOLOGY EXAM Jermaine Storm MD 3/10/2025 10:13 AM    6 : Stlo-pharyngeal margin Tissue Other SURGICAL PATHOLOGY EXAM Jermaine Storm MD 3/10/2025 10:14 AM          Findings:  Left parapharyngeal mass, cystic contents with rupture. Frozen with low grade malignancy. Margins obtained negative for malignancy.     Indications:  Mr. Zavala is a 69 year old male with ***      Procedure:  After consent, the patient was brought to the operating room and placed in the supine position.  Following induction, the patient was intubated orotracheally.  Monitoring lines were placed as appropriate.

## 2025-03-10 NOTE — DISCHARGE INSTRUCTIONS
Contacting your Doctor -   To contact a doctor, call Dr Storm at the ENT - Otolaryngology clinic at [phone]  or:  [phone] and ask for the resident on call for ENT-Otolaryngology (answered 24 hours a day)   Emergency Department:  [facility]: [phone]  [facility]: [phone] 911 if you are in need of immediate or emergent help

## 2025-03-10 NOTE — ANESTHESIA POSTPROCEDURE EVALUATION
Patient: Steven Zavala    Procedure: Procedure(s):  transcervical excision of the left parapharyngeal space       Anesthesia Type:  General    Note:  Disposition: Outpatient   Postop Pain Control: Uneventful            Sign Out: Well controlled pain   PONV: No   Neuro/Psych: Uneventful            Sign Out: Acceptable/Baseline neuro status   Airway/Respiratory: Uneventful            Sign Out: Acceptable/Baseline resp. status   CV/Hemodynamics: Uneventful            Sign Out: Acceptable CV status; No obvious hypovolemia; No obvious fluid overload   Other NRE: NONE   DID A NON-ROUTINE EVENT OCCUR? No           Last vitals:  Vitals Value Taken Time   BP 93/65 03/10/25 1215   Temp 37.2  C (99  F) 03/10/25 1127   Pulse 91 03/10/25 1225   Resp 17 03/10/25 1225   SpO2 99 % 03/10/25 1225   Vitals shown include unfiled device data.    Electronically Signed By: Heidi Contreras DO  March 10, 2025  12:25 PM

## 2025-03-10 NOTE — BRIEF OP NOTE
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]  Post-operative diagnosis Same as pre-operative diagnosis    Procedure: transcervical excision of the left parapharyngeal space, Left - Neck    Surgeon: Surgeons and Role:     * Jermaine Storm MD - Primary     * Shannon Ramirez MD - Assisting  Anesthesia: General   Estimated Blood Loss: 30cc    Drains: Hussein-Olea  Specimens:   ID Type Source Tests Collected by Time Destination   1 : Left Parapharyngeal Space mass Tissue Other SURGICAL PATHOLOGY EXAM Jermaine Storm MD 3/10/2025  9:29 AM    2 : Styloid Process Tissue Other SURGICAL PATHOLOGY EXAM Jermaine Storm MD 3/10/2025 10:07 AM    3 : Deep Lobe Parotid Tissue Parotid Gland, Left SURGICAL PATHOLOGY EXAM Jermaine Storm MD 3/10/2025 10:18 AM    4 : Posterior Margin Tissue Other SURGICAL PATHOLOGY EXAM Jermaine Storm MD 3/10/2025 10:12 AM    5 : pterygoid margin Tissue Other SURGICAL PATHOLOGY EXAM Jermaine Storm MD 3/10/2025 10:13 AM    6 : Stlo-pharyngeal margin Tissue Other SURGICAL PATHOLOGY EXAM Jermaine Storm MD 3/10/2025 10:14 AM      Findings:   None. Left parapharyngeal mass, cystic contents with rupture. Frozen with low grade malignancy. Margins obtained negative for malignancy.   Complications: None.  Implants: * No implants in log *

## 2025-03-10 NOTE — ANESTHESIA PREPROCEDURE EVALUATION
Anesthesia Pre-Procedure Evaluation    Patient: Steven Zavala   MRN: [id number] : 1955        Procedure : Procedure(s):  Transoral versus transcervical excision of the left parapharyngeal space          History reviewed. No pertinent past medical history.   Past Surgical History:   Procedure Laterality Date    ORTHOPEDIC SURGERY        No Known Allergies   Social History     Tobacco Use    Smoking status: Never    Smokeless tobacco: Never   Substance Use Topics    Alcohol use: Never      Wt Readings from Last 1 Encounters:   03/10/25 86.2 kg (190 lb)        Anesthesia Evaluation   Pt has had prior anesthetic.     No history of anesthetic complications       ROS/MED HX  ENT/Pulmonary:  - neg pulmonary ROS     Neurologic: Comment: Quadriplegia d/t cervical injury 40 years ago      Cardiovascular:  - neg cardiovascular ROS     METS/Exercise Tolerance:     Hematologic:       Musculoskeletal:       GI/Hepatic:  - neg GI/hepatic ROS     Renal/Genitourinary:  - neg Renal ROS     Endo:  - neg endo ROS     Psychiatric/Substance Use:       Infectious Disease: Comment: Pressure wounds      Malignancy:       Other:            Physical Exam    Airway      Comment: Small mass in left pharyngeal space, nonobstructive of airway    Mallampati: I       Respiratory Devices and Support         Dental       (+) Completely normal teeth      Cardiovascular   cardiovascular exam normal          Pulmonary   pulmonary exam normal                OUTSIDE LABS:  CBC:   Lab Results   Component Value Date    WBC 7.8 2023    WBC 9.3 2023    HGB 11.6 (L) 2023    HGB 11.2 (L) 2023    HCT 35.0 (L) 2023    HCT 33.4 (L) 2023     2023     2023     BMP:   Lab Results   Component Value Date     (L) 2023     (L) 2023    POTASSIUM 3.7 2023    POTASSIUM 3.9 2023    CHLORIDE 100 2023    CHLORIDE 101 2023    CO2 22 2023    CO2 22  "07/08/2023    BUN 18.4 07/09/2023    BUN 18.6 07/08/2023    CR 0.58 (L) 07/12/2023    CR 0.58 (L) 07/10/2023    GLC 90 03/10/2025     (H) 07/09/2023     COAGS:   Lab Results   Component Value Date    INR 1.26 (H) 07/10/2023     POC: No results found for: \"BGM\", \"HCG\", \"HCGS\"  HEPATIC:   Lab Results   Component Value Date    ALBUMIN 3.5 07/07/2023    PROTTOTAL 7.0 07/07/2023    ALT 42 07/07/2023    AST 22 07/07/2023    ALKPHOS 65 07/07/2023    BILITOTAL 0.3 07/07/2023     OTHER:   Lab Results   Component Value Date    DONNY 8.8 07/09/2023       Anesthesia Plan    ASA Status:  3    NPO Status:  NPO Appropriate    Anesthesia Type: General.     - Airway: ETT   Induction: Intravenous.   Maintenance: Balanced.        Consents            Postoperative Care    Pain management: Multi-modal analgesia.   PONV prophylaxis: Ondansetron (or other 5HT-3), Dexamethasone or Solumedrol     Comments:               Dangelo Ayala MD    I have reviewed the pertinent notes and labs in the chart from the past 30 days and (re)examined the patient.  Any updates or changes from those notes are reflected in this note.    Clinically Significant Risk Factors Present on Admission                # Drug Induced Coagulation Defect: home medication list includes an anticoagulant medication              # Overweight: Estimated body mass index is 25.77 kg/m  as calculated from the following:    Height as of this encounter: 1.829 m (6').    Weight as of this encounter: 86.2 kg (190 lb).                "

## 2025-03-10 NOTE — ANESTHESIA PROCEDURE NOTES
Airway       Patient location during procedure: OR       Procedure Start/Stop Times: 3/10/2025 8:07 AM  Staff -        Anesthesiologist:  Dangelo Ayala MD       CRNA: Odalys Bro APRN CRNA       Performed By: CRNA  Consent for Airway        Urgency: elective  Indications and Patient Condition       Indications for airway management: rohit-procedural       Induction type:intravenous       Mask difficulty assessment: 2 - vent by mask + OA or adjuvant +/- NMBA    Final Airway Details       Final airway type: endotracheal airway       Successful airway: Reinforced tube, ETT - single and Oral  Endotracheal Airway Details        ETT size (mm): 7.5       Cuffed: yes       Successful intubation technique: video laryngoscopy       VL Blade Size: Glidescope 3       Grade View of Cords: 1       Adjucts: stylet       Position: Right       Measured from: lips       Secured at (cm): 24       Bite block used: None    Post intubation assessment        Placement verified by: capnometry, equal breath sounds and chest rise        Number of attempts at approach: 1       Number of other approaches attempted: 0       Secured with: tape and other (comment) (tegaderm)       Ease of procedure: easy       Dentition: Unchanged    Medication(s) Administered   Medication Administration Time: 3/10/2025 8:07 AM

## 2025-03-10 NOTE — ANESTHESIA CARE TRANSFER NOTE
Patient: Steven Zavala    Procedure: Procedure(s):  transcervical excision of the left parapharyngeal space       Diagnosis: Parapharyngeal space mass [R22.1]  Diagnosis Additional Information: No value filed.    Anesthesia Type:   General     Note:    Oropharynx: oropharynx clear of all foreign objects and spontaneously breathing  Level of Consciousness: awake  Oxygen Supplementation: face mask  Level of Supplemental Oxygen (L/min / FiO2): 6  Independent Airway: airway patency satisfactory and stable  Dentition: dentition unchanged  Vital Signs Stable: post-procedure vital signs reviewed and stable  Report to RN Given: handoff report given  Patient transferred to: PACU    Handoff Report: Identifed the Patient, Identified the Reponsible Provider, Reviewed the pertinent medical history, Discussed the surgical course, Reviewed Intra-OP anesthesia mangement and issues during anesthesia, Set expectations for post-procedure period and Allowed opportunity for questions and acknowledgement of understanding      Vitals:  Vitals Value Taken Time   /79 03/10/25 1130   Temp 37.2 03/10/25 1125   Pulse 89 03/10/25 1133   Resp 16 03/10/25 1133   SpO2 100 % 03/10/25 1133   Vitals shown include unfiled device data.    Electronically Signed By: MARGARET Sharp CRNA  March 10, 2025  11:34 AM

## 2025-03-12 NOTE — OP NOTE
March 10, 2025      Pre-op Diagnosis:  Parapharyngeal Space Mass  Post-op Diagnosis:   Same   Procedure:   Transcervical excision of left parapharyngeal space mass     Surgeons:   Shannon Ramirez MD; Jermaine Storm MD  Anesthesia:  GETA  EBL:  30 cc  Drains:  7 round ELIDIA drain      Complications:   None   Specimens:     ID Type Source Tests Collected by Time Destination   1 : Left Parapharyngeal Space mass Tissue Other SURGICAL PATHOLOGY EXAM Jermaine Storm MD 3/10/2025  9:29 AM    2 : Styloid Process Tissue Other SURGICAL PATHOLOGY EXAM Jermaine Storm MD 3/10/2025 10:07 AM    3 : Deep Lobe Parotid Tissue Parotid Gland, Left SURGICAL PATHOLOGY EXAM Jermaine Storm MD 3/10/2025 10:18 AM    4 : Posterior Margin Tissue Other SURGICAL PATHOLOGY EXAM Jermaine Storm MD 3/10/2025 10:12 AM    5 : pterygoid margin Tissue Other SURGICAL PATHOLOGY EXAM Jermaine Storm MD 3/10/2025 10:13 AM    6 : Stlo-pharyngeal margin Tissue Other SURGICAL PATHOLOGY EXAM Jermaine Storm MD 3/10/2025 10:14 AM          Findings:   Left parapharyngeal mass, cystic contents with rupture. Frozen with low grade malignancy. Margins obtained negative for malignancy.     Indications:  Mr. Zavala is a 69 year old male who was referred to ENT due to swelling of the left oropharynx. Imaging demonstrated a cystic mass in the left parapharyngeal space. He had a needle aspiration decompression of the mass by Dr. Dudley and pathology returned as Basal cell neoplasm. He was referred to Dr. Storm for surgical excision, and after a discussion of risks and benefits, he consented to the above procedure.        Procedure:  After consent, the patient was brought to the operating room and placed in the supine position.  Following induction, the patient was intubated orotracheally.  Monitoring lines were placed as appropriate. The table was turned 180 degrees and a shoulder roll was placed. Facial nerve monitoring electrodes  were placed in the mentalis and tap test confirmed placement. A 5cm incision was designed 2 finger breaths below the mandible along the left neck and injected with 1:100,000 epinephrine. The patient was then prepped and draped in the usual fashion and a time out was performed with all operating room staff to confirm the patient and site of surgery. Incision was made though skin and carried through platysma with a 15 blade. Subplatysmal flaps were elevated to the mandible. The fascia was incised at the inferior margin of the submandibular gland and this was reflected superiorly, protecting the marginal mandibular nerve. The facial artery and vein were ligated and divided. The submandibular gland was retracted anteriorly and the digastric was identified. We then finger dissected deep to the mandible and palpated the styloid which was already fractured. We palpated the mass and continued finger dissecting around the mass, releasing it from its attachments. During this process, the mass wall ruptured and clear fluid was released. We identified the attachment of the mass to the deep lobe of the parotid and this was divided with bipolar. The mass was fully dissected free and removed in entirety. It was brought to pathology for frozen evaluation which returned positive for low grade neoplasm. Thus frozen margins were obtained for evaluation including the connection to the deep lobe of the parotid, and the deep and anterior tissue surrounding the mass. These biopsies returned negative for malignancy. We then copiously irrigated the neck with normal saline and placed a 7 round Hussein-Olea drain and secured it to the skin with 3-0 nylon. The skin was then closed in layers with 3-0 vicryl and 4-0 monocryl with steri strips. The patient was then returned to anesthesia, awoken, extubated and brought to PACU in stable condition.     Dr. Storm was present for the duration of the procedure     Shannon Ramirez  MD

## 2025-03-13 ENCOUNTER — PATIENT OUTREACH (OUTPATIENT)
Dept: OTOLARYNGOLOGY | Facility: CLINIC | Age: 70
End: 2025-03-13
Payer: COMMERCIAL

## 2025-03-13 NOTE — TELEPHONE ENCOUNTER
Responsible Attending Physician: Emeterio  Date of Discharge: 3/10/2025  Discharge to: Home     Current Status:  Pt is a 68 y/o male s/p pharapharyngeal space mass removal on 3/10/25. Reports pre-op symptoms improved. Operative  pain is decreasing. Ambulating without assistance. Pain well controlled with current meds, ample supply.  Reports incision CDI without signs of infection. Denies redness, swelling, increased tenderness, or elevated temp. Denies current bowel or bladder issues.      Discharge instructions and medication use were reviewed. RN triage/on call number given: [phone] or after hours and w/e - [phone]. Patient verbalized understanding and agreement with current plan.     Patient's home care RN will be able to remove ELIDIA drain for patient. Provided direct call back number if home care nurse had questions regarding drain removal. Patient will follow up as scheduled.    Yvrose ORTEGAN, RN

## 2025-03-20 LAB
PATH REPORT.COMMENTS IMP SPEC: NORMAL
PATH REPORT.FINAL DX SPEC: NORMAL
PATH REPORT.GROSS SPEC: NORMAL
PATH REPORT.INTRAOP OBS SPEC DOC: NORMAL
PATH REPORT.MICROSCOPIC SPEC OTHER STN: NORMAL
PATH REPORT.RELEVANT HX SPEC: NORMAL
PHOTO IMAGE: NORMAL

## 2025-03-21 ENCOUNTER — TUMOR CONFERENCE (OUTPATIENT)
Dept: ONCOLOGY | Facility: CLINIC | Age: 70
End: 2025-03-21
Payer: COMMERCIAL

## 2025-03-21 PROCEDURE — 81456 SO/HL 51/>GSAP RNA ALYS: CPT | Performed by: OTOLARYNGOLOGY

## 2025-03-21 NOTE — TUMOR CONFERENCE
Head & Neck Tumor Conference Note   March 21, 2025  Status: Established    Staff: Dr. Torres    Tumor Site: Left parapharyngeal space  Tumor Pathology: Basal cell adenoma  Tumor Stage: Low grade  Tumor Treatment:   3/10/25: Transcervical excision of left parapharyngeal space mass     Reason for Review: Review path and POC    Brief History: Mr. Zavala is a 69 year old male who was referred to ENT due to swelling of the left oropharynx. Imaging demonstrated a cystic mass in the left parapharyngeal space. He had a needle aspiration decompression of the mass by Dr. Westbrook and pathology returned as Basal cell neoplasm. He was referred to Dr. Storm for surgical excision which was performed 3/10/25. We are here today to review final pathology and plan of care.    Pertinent PMH: No past medical history on file.   Smoking Hx:   Social History     Tobacco Use    Smoking status: Never    Smokeless tobacco: Never   Substance Use Topics    Alcohol use: Never    Drug use: Never     Pathology:   Final Diagnosis   A. LEFT PARAPHARYNGEAL SPACE MASS:  - LOW GRADE BASALOID NEOPLASM, CONSISTENT WITH BASAL CELL ADENOMA  - Tumor size: 4.5 cm in greatest dimension  - Completely excised     B. STYLOID PROCESS:  - Negative for tumor     C. DEEP LOBE PAROTID:  - Negative for tumor     D. POSTERIOR MARGIN:  - Negative for tumor     E. PTERYGOID MARGIN:  - Negative for tumor     F. STLO-PHARYNGEAL MARGIN:  - Negative for tumor       Tumor Board Recommendation:   Discussion: Final path was reviewed. There was nothing concerning for malignancy on final path and this specimen was consistent with a low grade basal cell adenoma.   Plan: Continue observation, no adjuvant treatment recommended based on pathology      Vicki Narvaez MD  Otolaryngology-Head & Neck Surgery PGY-3      Documentation / Disclaimer Cancer Tumor Board Note: Cancer tumor board recommendations do not override what is determined to be reasonable care and treatment, which is  dependent on the circumstances of a patient's case; the patient's medical, social, and personal concerns; and the clinical judgment of the oncologist [physician].

## 2025-03-21 NOTE — TUMOR CONFERENCE
Called and spoke with patient with the following pathology results:    Final Diagnosis   A. LEFT PARAPHARYNGEAL SPACE MASS:  - LOW GRADE BASALOID NEOPLASM, CONSISTENT WITH BASAL CELL ADENOMA  - Tumor size: 4.5 cm in greatest dimension  - Completely excised     B. STYLOID PROCESS:  - Negative for tumor     C. DEEP LOBE PAROTID:  - Negative for tumor     D. POSTERIOR MARGIN:  - Negative for tumor     E. PTERYGOID MARGIN:  - Negative for tumor     F. STLO-PHARYNGEAL MARGIN:  - Negative for tumor           Patient indicates that he is doing well post op. He has no concerns at this time. His ELIDIA drain was removed today by his home care nurse. Patient scheduled for follow-up with Dr. Storm on Wednesday. He is requesting to change this to a virtual visit

## 2025-03-26 ENCOUNTER — VIRTUAL VISIT (OUTPATIENT)
Dept: OTOLARYNGOLOGY | Facility: CLINIC | Age: 70
End: 2025-03-26
Payer: COMMERCIAL

## 2025-03-26 DIAGNOSIS — R22.1 PARAPHARYNGEAL SPACE MASS: Primary | ICD-10-CM

## 2025-03-26 NOTE — PATIENT INSTRUCTIONS
1. Please follow-up in clinic as needed.   2. Please call the ENT clinic with any questions,concerns, new or worsening symptoms.    -Clinic number is [phone]   - [facility]'s direct line (Dr. Youssef's nurse) [phone]

## 2025-03-26 NOTE — LETTER
3/26/2025       RE: Steven Zavala  [address]  [place] [address]     Dear Colleague,    Thank you for referring your patient, Steven Zavala, to the [facility] [place] [facility]. Please see a copy of my visit note below.    I conducted a video visit with the patient from 4:38 PM to 4:56 PM.    The patient is a 69-year-old male who is approximately 2 and half weeks out from a left-sided transcervical approach for removal of the left parapharyngeal space mass.  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  Intraoperatively, there was some concern about a potential malignancy so we sampled margins, all of which were also negative.    The patient notes on today's video visit that he is doing very well and his swelling continues to come down.  He feels the incision is healing very nicely and showed it to me on the camera and it looks clear that there is no infection or collection.    Given that the patient is doing well, I counseled him that he can follow-up with us on a as needed basis.      Again, thank you for allowing me to participate in the care of your patient.      Sincerely,    Jermaine Storm MD

## 2025-03-27 NOTE — PROGRESS NOTES
I conducted a video visit with the patient from 4:38 PM to 4:56 PM.    The patient is a 69-year-old male who is approximately 2 and half weeks out from a left-sided transcervical approach for removal of the left parapharyngeal space mass.  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  Intraoperatively, there was some concern about a potential malignancy so we sampled margins, all of which were also negative.    The patient notes on today's video visit that he is doing very well and his swelling continues to come down.  He feels the incision is healing very nicely and showed it to me on the camera and it looks clear that there is no infection or collection.    Given that the patient is doing well, I counseled him that he can follow-up with us on a as needed basis.

## (undated) DEVICE — DRSG TELFA 3X8" 1238

## (undated) DEVICE — Device

## (undated) DEVICE — ESU ELEC BLADE 2.75" COATED/INSULATED E1455

## (undated) DEVICE — RETR ELASTIC STAYS LONE STAR BLUNT DUAL LEAD 3550-1G

## (undated) DEVICE — ESU ELEC NDL 1" COATED/INSULATED E1465

## (undated) DEVICE — CLIP HORIZON MED BLUE 002200

## (undated) DEVICE — STPL SKIN 35W ROTATING HEAD PRW35

## (undated) DEVICE — DRAIN JACKSON PRATT RESERVOIR 100ML SU130-1305

## (undated) DEVICE — TUBING SUCTION 10'X3/16" N510

## (undated) DEVICE — SPONGE LAP 18X18" X8435

## (undated) DEVICE — ESU CORD BIPOLAR GREEN 10-4000

## (undated) DEVICE — SU ETHILON 3-0 PS-1 18" 1663H

## (undated) DEVICE — SPONGE KITTNER 30-101

## (undated) DEVICE — ESU CORD BIPOLAR AND IRR TUBING AESCULAP US355

## (undated) DEVICE — CLIP HORIZON SM RED WIDE SLOT 001201

## (undated) DEVICE — SU SILK 2-0 TIE 12X30" A305H

## (undated) DEVICE — SU MONOCRYL 4-0 PS-2 27" UND Y426H

## (undated) DEVICE — SU VICRYL 3-0 SH 8X18" UND J864D

## (undated) DEVICE — NIM PROBE PRASS INCREMENTING TIP 8225825

## (undated) DEVICE — SU SILK 2-0 SH CR 8X18" C012D

## (undated) DEVICE — ADH LIQUID MASTISOL TOPICAL VIAL 2-3ML 0523-48

## (undated) DEVICE — BLADE KNIFE SURG 15 371115

## (undated) DEVICE — ESU PENCIL SMOKE EVAC W/ROCKER SWITCH 0703-047-000

## (undated) DEVICE — SPONGE RAY-TEC 4X8" 7318

## (undated) DEVICE — SUCTION SLEEVE NEPTUNE 2 125MM 0703-005-125

## (undated) DEVICE — DRAPE SHEET MED 44X70" 9355

## (undated) DEVICE — SU SILK 3-0 TIE 12X30" A304H

## (undated) DEVICE — NDL COUNTER 20CT 31142493

## (undated) DEVICE — SU SILK 0 TIE 6X30" A306H

## (undated) DEVICE — DRAPE U SPLIT 74X120" 29440

## (undated) DEVICE — DRSG STERI STRIP 1/2X4" R1547

## (undated) DEVICE — DRAPE MAYO STAND 23X54 8337

## (undated) DEVICE — SU SILK 4-0 TIE 12X30" A303H

## (undated) RX ORDER — CEFAZOLIN SODIUM/WATER 2 G/20 ML
SYRINGE (ML) INTRAVENOUS
Status: DISPENSED
Start: 2025-03-10

## (undated) RX ORDER — FENTANYL CITRATE-0.9 % NACL/PF 10 MCG/ML
PLASTIC BAG, INJECTION (ML) INTRAVENOUS
Status: DISPENSED
Start: 2025-03-10

## (undated) RX ORDER — PROPOFOL 10 MG/ML
INJECTION, EMULSION INTRAVENOUS
Status: DISPENSED
Start: 2025-03-10

## (undated) RX ORDER — FENTANYL CITRATE 50 UG/ML
INJECTION, SOLUTION INTRAMUSCULAR; INTRAVENOUS
Status: DISPENSED
Start: 2025-03-10

## (undated) RX ORDER — GLYCOPYRROLATE 0.2 MG/ML
INJECTION, SOLUTION INTRAMUSCULAR; INTRAVENOUS
Status: DISPENSED
Start: 2025-03-10

## (undated) RX ORDER — DEXAMETHASONE SODIUM PHOSPHATE 4 MG/ML
INJECTION, SOLUTION INTRA-ARTICULAR; INTRALESIONAL; INTRAMUSCULAR; INTRAVENOUS; SOFT TISSUE
Status: DISPENSED
Start: 2025-03-10

## (undated) RX ORDER — OXYCODONE HYDROCHLORIDE 5 MG/1
TABLET ORAL
Status: DISPENSED
Start: 2025-03-10

## (undated) RX ORDER — ONDANSETRON 2 MG/ML
INJECTION INTRAMUSCULAR; INTRAVENOUS
Status: DISPENSED
Start: 2025-03-10

## (undated) RX ORDER — ACETAMINOPHEN 325 MG/1
TABLET ORAL
Status: DISPENSED
Start: 2025-03-10